# Patient Record
Sex: FEMALE | Race: OTHER | NOT HISPANIC OR LATINO | URBAN - METROPOLITAN AREA
[De-identification: names, ages, dates, MRNs, and addresses within clinical notes are randomized per-mention and may not be internally consistent; named-entity substitution may affect disease eponyms.]

---

## 2017-01-07 ENCOUNTER — EMERGENCY (EMERGENCY)
Facility: HOSPITAL | Age: 32
LOS: 1 days | Discharge: HOME | End: 2017-01-07
Attending: EMERGENCY MEDICINE | Admitting: EMERGENCY MEDICINE
Payer: SELF-PAY

## 2017-01-07 VITALS
RESPIRATION RATE: 18 BRPM | HEART RATE: 102 BPM | DIASTOLIC BLOOD PRESSURE: 60 MMHG | OXYGEN SATURATION: 99 % | TEMPERATURE: 98 F | SYSTOLIC BLOOD PRESSURE: 99 MMHG

## 2017-01-07 VITALS
TEMPERATURE: 99 F | RESPIRATION RATE: 16 BRPM | OXYGEN SATURATION: 100 % | DIASTOLIC BLOOD PRESSURE: 57 MMHG | SYSTOLIC BLOOD PRESSURE: 130 MMHG | HEART RATE: 95 BPM

## 2017-01-07 LAB
BASE EXCESS BLDV CALC-SCNC: -0.8 MMOL/L — SIGNIFICANT CHANGE UP
BLOOD GAS VENOUS - CREATININE: 0.59 MG/DL — SIGNIFICANT CHANGE UP (ref 0.5–1.3)
CHLORIDE BLDV-SCNC: 106 MMOL/L — SIGNIFICANT CHANGE UP (ref 96–108)
GAS PNL BLDV: 133 MMOL/L — LOW (ref 136–146)
GLUCOSE BLDV-MCNC: 99 — SIGNIFICANT CHANGE UP (ref 70–99)
HCO3 BLDV-SCNC: 23 MMOL/L — SIGNIFICANT CHANGE UP (ref 20–27)
HCT VFR BLDV CALC: 41.7 % — SIGNIFICANT CHANGE UP (ref 34.5–45)
HGB BLDV-MCNC: 13.6 G/DL — SIGNIFICANT CHANGE UP (ref 11.5–15.5)
LACTATE BLDV-MCNC: 2.1 MMOL/L — HIGH (ref 0.5–2)
PCO2 BLDV: 42 MMHG — SIGNIFICANT CHANGE UP (ref 41–51)
PH BLDV: 7.37 PH — SIGNIFICANT CHANGE UP (ref 7.32–7.43)
PO2 BLDV: 43 MMHG — HIGH (ref 35–40)
POTASSIUM BLDV-SCNC: 3.7 MMOL/L — SIGNIFICANT CHANGE UP (ref 3.4–4.5)
SAO2 % BLDV: 75.3 % — SIGNIFICANT CHANGE UP (ref 60–85)

## 2017-01-07 PROCEDURE — 93010 ELECTROCARDIOGRAM REPORT: CPT

## 2017-01-07 PROCEDURE — 99284 EMERGENCY DEPT VISIT MOD MDM: CPT | Mod: 25

## 2017-01-07 RX ORDER — ONDANSETRON 8 MG/1
4 TABLET, FILM COATED ORAL ONCE
Qty: 0 | Refills: 0 | Status: COMPLETED | OUTPATIENT
Start: 2017-01-07 | End: 2017-01-07

## 2017-01-07 RX ORDER — SODIUM CHLORIDE 9 MG/ML
1000 INJECTION INTRAMUSCULAR; INTRAVENOUS; SUBCUTANEOUS ONCE
Qty: 0 | Refills: 0 | Status: COMPLETED | OUTPATIENT
Start: 2017-01-07 | End: 2017-01-07

## 2017-01-07 RX ORDER — ACETAMINOPHEN 500 MG
1000 TABLET ORAL ONCE
Qty: 0 | Refills: 0 | Status: COMPLETED | OUTPATIENT
Start: 2017-01-07 | End: 2017-01-07

## 2017-01-07 RX ADMIN — ONDANSETRON 4 MILLIGRAM(S): 8 TABLET, FILM COATED ORAL at 17:56

## 2017-01-07 RX ADMIN — Medication 400 MILLIGRAM(S): at 18:08

## 2017-01-07 RX ADMIN — SODIUM CHLORIDE 1000 MILLILITER(S): 9 INJECTION INTRAMUSCULAR; INTRAVENOUS; SUBCUTANEOUS at 18:08

## 2017-01-07 NOTE — ED PROVIDER NOTE - OBJECTIVE STATEMENT
32yo F no sig PMHx p/w CC flu like symptoms - she states that today she began experiencing fever, chills, rhinorrhea, generalized body aches, nausea and multiple episodes of NBNB vomiting. She also reports brief episode of sharp chest pain which occurred earlier today and lasted seconds and resolved spontaneously. Pt. visited urgent care today and tested positive for Influenza A. She currently denies visual changes, sore throat, chest pain or discomfort, SOB, palpitations, abdominal pain, diarrhea, urinary symptoms.

## 2017-01-07 NOTE — ED ADULT NURSE NOTE - CHIEF COMPLAINT QUOTE
pt sent from [facility] for CP with + influenza A with symptoms started 1 day ago. R/O pericarditis/myocarditis see EKG/CXR in chart.   c/o body aches/nasal congestion

## 2017-01-07 NOTE — ED ADULT TRIAGE NOTE - CHIEF COMPLAINT QUOTE
pt sent from [facility] for CP with + influenza A with symptoms started 1 day ago. R/O pericarditis/myocarditis see EKG/CXR in chart. pt sent from [facility] for CP with + influenza A with symptoms started 1 day ago. R/O pericarditis/myocarditis see EKG/CXR in chart.   c/o body aches/nasal congestion

## 2017-01-07 NOTE — ED ADULT NURSE NOTE - OBJECTIVE STATEMENT
pt received A&Ox3, c/o bodyache and chills, pt denies sob, denies chest pain, skin intact, pt came from [facility] and they told her she has the flu. will monitor , labs sent.

## 2017-01-08 NOTE — ED POST DISCHARGE NOTE - REASON FOR FOLLOW-UP
Other Pharmacy called Tamiflu ERX for 1 pill . I discussed with pharmacist will switch to Tamiflu 75mg BID X 5 days.

## 2020-01-02 ENCOUNTER — RESULT REVIEW (OUTPATIENT)
Age: 35
End: 2020-01-02

## 2020-02-04 ENCOUNTER — APPOINTMENT (OUTPATIENT)
Dept: DERMATOLOGY | Facility: CLINIC | Age: 35
End: 2020-02-04
Payer: COMMERCIAL

## 2020-02-04 VITALS
WEIGHT: 142 LBS | HEIGHT: 63 IN | BODY MASS INDEX: 25.16 KG/M2 | DIASTOLIC BLOOD PRESSURE: 74 MMHG | SYSTOLIC BLOOD PRESSURE: 110 MMHG

## 2020-02-04 DIAGNOSIS — Z78.9 OTHER SPECIFIED HEALTH STATUS: ICD-10-CM

## 2020-02-04 DIAGNOSIS — Z87.2 PERSONAL HISTORY OF DISEASES OF THE SKIN AND SUBCUTANEOUS TISSUE: ICD-10-CM

## 2020-02-04 DIAGNOSIS — Z91.89 OTHER SPECIFIED PERSONAL RISK FACTORS, NOT ELSEWHERE CLASSIFIED: ICD-10-CM

## 2020-02-04 PROBLEM — Z00.00 ENCOUNTER FOR PREVENTIVE HEALTH EXAMINATION: Status: ACTIVE | Noted: 2020-02-04

## 2020-02-04 PROCEDURE — 99203 OFFICE O/P NEW LOW 30 MIN: CPT | Mod: 25

## 2020-02-04 PROCEDURE — 11900 INJECT SKIN LESIONS </W 7: CPT

## 2020-02-04 RX ORDER — ERYTHROMYCIN 20 MG/G
GEL TOPICAL
Refills: 0 | Status: ACTIVE | COMMUNITY

## 2020-04-26 ENCOUNTER — MESSAGE (OUTPATIENT)
Age: 35
End: 2020-04-26

## 2020-05-11 LAB
SARS-COV-2 IGG SERPL IA-ACNC: 0 INDEX
SARS-COV-2 IGG SERPL QL IA: NEGATIVE

## 2020-06-02 ENCOUNTER — APPOINTMENT (OUTPATIENT)
Dept: DERMATOLOGY | Facility: CLINIC | Age: 35
End: 2020-06-02
Payer: COMMERCIAL

## 2020-06-02 VITALS — TEMPERATURE: 97.8 F

## 2020-06-02 PROCEDURE — 99214 OFFICE O/P EST MOD 30 MIN: CPT

## 2020-06-02 RX ORDER — CLINDAMYCIN PHOSPHATE 1 G/10ML
1 GEL TOPICAL
Qty: 1 | Refills: 2 | Status: ACTIVE | COMMUNITY
Start: 2020-06-02 | End: 1900-01-01

## 2020-09-04 ENCOUNTER — APPOINTMENT (OUTPATIENT)
Dept: DERMATOLOGY | Facility: CLINIC | Age: 35
End: 2020-09-04
Payer: COMMERCIAL

## 2020-09-04 VITALS — TEMPERATURE: 98.4 F

## 2020-09-04 DIAGNOSIS — L81.1 CHLOASMA: ICD-10-CM

## 2020-09-04 PROCEDURE — 99213 OFFICE O/P EST LOW 20 MIN: CPT

## 2020-09-04 RX ORDER — SPIRONOLACTONE 100 MG/1
100 TABLET ORAL
Qty: 30 | Refills: 2 | Status: ACTIVE | COMMUNITY
Start: 2020-09-04 | End: 1900-01-01

## 2020-09-04 RX ORDER — TRETINOIN 0.5 MG/G
0.05 CREAM TOPICAL
Qty: 1 | Refills: 3 | Status: ACTIVE | COMMUNITY
Start: 2020-02-04 | End: 1900-01-01

## 2020-09-08 ENCOUNTER — APPOINTMENT (OUTPATIENT)
Dept: ENDOCRINOLOGY | Facility: CLINIC | Age: 35
End: 2020-09-08
Payer: COMMERCIAL

## 2020-09-08 VITALS
TEMPERATURE: 98.6 F | OXYGEN SATURATION: 99 % | DIASTOLIC BLOOD PRESSURE: 80 MMHG | WEIGHT: 159 LBS | HEART RATE: 85 BPM | BODY MASS INDEX: 28.17 KG/M2 | SYSTOLIC BLOOD PRESSURE: 110 MMHG | HEIGHT: 63 IN

## 2020-09-08 DIAGNOSIS — R63.5 ABNORMAL WEIGHT GAIN: ICD-10-CM

## 2020-09-08 PROCEDURE — 99204 OFFICE O/P NEW MOD 45 MIN: CPT | Mod: 25

## 2020-09-08 PROCEDURE — 36415 COLL VENOUS BLD VENIPUNCTURE: CPT

## 2020-09-08 NOTE — PHYSICAL EXAM
[Well Nourished] : well nourished [Alert] : alert [No Acute Distress] : no acute distress [Well Developed] : well developed [Normal Sclera/Conjunctiva] : normal sclera/conjunctiva [No Proptosis] : no proptosis [Normal Oropharynx] : the oropharynx was normal [EOMI] : extra ocular movement intact [Thyroid Not Enlarged] : the thyroid was not enlarged [No Thyroid Nodules] : no palpable thyroid nodules [No Respiratory Distress] : no respiratory distress [Clear to Auscultation] : lungs were clear to auscultation bilaterally [No Accessory Muscle Use] : no accessory muscle use [Normal S1, S2] : normal S1 and S2 [Normal Rate] : heart rate was normal [Regular Rhythm] : with a regular rhythm [No Edema] : no peripheral edema [Pedal Pulses Normal] : the pedal pulses are present [Normal Bowel Sounds] : normal bowel sounds [Not Tender] : non-tender [Not Distended] : not distended [Soft] : abdomen soft [Normal Anterior Cervical Nodes] : no anterior cervical lymphadenopathy [Normal Posterior Cervical Nodes] : no posterior cervical lymphadenopathy [Spine Straight] : spine straight [No Spinal Tenderness] : no spinal tenderness [No Stigmata of Cushings Syndrome] : no stigmata of Cushings Syndrome [Normal Gait] : normal gait [Normal Strength/Tone] : muscle strength and tone were normal [No Rash] : no rash [No Tremors] : no tremors [Normal Reflexes] : deep tendon reflexes were 2+ and symmetric [Oriented x3] : oriented to person, place, and time

## 2020-09-08 NOTE — PHYSICAL EXAM
[Well Nourished] : well nourished [Alert] : alert [Normal Sclera/Conjunctiva] : normal sclera/conjunctiva [No Acute Distress] : no acute distress [Well Developed] : well developed [No Proptosis] : no proptosis [Normal Oropharynx] : the oropharynx was normal [EOMI] : extra ocular movement intact [No Thyroid Nodules] : no palpable thyroid nodules [Thyroid Not Enlarged] : the thyroid was not enlarged [No Respiratory Distress] : no respiratory distress [Clear to Auscultation] : lungs were clear to auscultation bilaterally [No Accessory Muscle Use] : no accessory muscle use [Normal S1, S2] : normal S1 and S2 [Normal Rate] : heart rate was normal [Regular Rhythm] : with a regular rhythm [No Edema] : no peripheral edema [Pedal Pulses Normal] : the pedal pulses are present [Normal Bowel Sounds] : normal bowel sounds [Not Tender] : non-tender [Not Distended] : not distended [Soft] : abdomen soft [Normal Anterior Cervical Nodes] : no anterior cervical lymphadenopathy [Normal Posterior Cervical Nodes] : no posterior cervical lymphadenopathy [No Spinal Tenderness] : no spinal tenderness [Spine Straight] : spine straight [No Stigmata of Cushings Syndrome] : no stigmata of Cushings Syndrome [Normal Gait] : normal gait [Normal Strength/Tone] : muscle strength and tone were normal [No Rash] : no rash [Normal Reflexes] : deep tendon reflexes were 2+ and symmetric [No Tremors] : no tremors [Oriented x3] : oriented to person, place, and time

## 2020-09-15 PROBLEM — R63.5 WEIGHT GAIN: Status: ACTIVE | Noted: 2020-09-15

## 2020-10-09 ENCOUNTER — TRANSCRIPTION ENCOUNTER (OUTPATIENT)
Age: 35
End: 2020-10-09

## 2020-10-09 ENCOUNTER — APPOINTMENT (OUTPATIENT)
Dept: ENDOCRINOLOGY | Facility: CLINIC | Age: 35
End: 2020-10-09
Payer: COMMERCIAL

## 2020-10-09 VITALS
OXYGEN SATURATION: 99 % | HEART RATE: 82 BPM | BODY MASS INDEX: 28.35 KG/M2 | TEMPERATURE: 98.5 F | WEIGHT: 160 LBS | HEIGHT: 63 IN | DIASTOLIC BLOOD PRESSURE: 75 MMHG | SYSTOLIC BLOOD PRESSURE: 110 MMHG

## 2020-10-09 LAB
25(OH)D3 SERPL-MCNC: 23.5 NG/ML
ACTH SER-ACNC: 6.6 PG/ML
ALBUMIN SERPL ELPH-MCNC: 4.4 G/DL
ALP BLD-CCNC: 73 U/L
ALT SERPL-CCNC: 9 U/L
ANION GAP SERPL CALC-SCNC: 12 MMOL/L
AST SERPL-CCNC: 16 U/L
BILIRUB SERPL-MCNC: 0.2 MG/DL
BUN SERPL-MCNC: 17 MG/DL
CALCIUM SERPL-MCNC: 9.8 MG/DL
CHLORIDE SERPL-SCNC: 103 MMOL/L
CHOLEST SERPL-MCNC: 328 MG/DL
CHOLEST/HDLC SERPL: 5.8 RATIO
CO2 SERPL-SCNC: 20 MMOL/L
CORTICOSTEROID BIND GLOBULIN: 4.2 MG/DL
CORTICOSTEROID BIND GLOBULIN: 4.5 MG/DL
CORTIS SERPL-MCNC: 22 UG/DL
CORTIS SERPL-MCNC: 26 UG/DL
CORTIS SERPL-MCNC: 4.3 UG/DL
CORTISOL, FREE: 1.4 UG/DL
CREAT SERPL-MCNC: 0.65 MG/DL
DEXAMETHASONE LEVEL: NORMAL
FERRITIN SERPL-MCNC: 69 NG/ML
GH SERPL-MCNC: 0.15 NG/ML
GLUCOSE SERPL-MCNC: 90 MG/DL
HDLC SERPL-MCNC: 56 MG/DL
IGF-1 INTERP: NORMAL
IGF-I BLD-MCNC: 198 NG/ML
INSULIN SERPL-MCNC: 12.1 UU/ML
IRON SERPL-MCNC: 73 UG/DL
LDLC SERPL CALC-MCNC: 236 MG/DL
PFCX: 5.4 %
POTASSIUM SERPL-SCNC: 4.2 MMOL/L
PROT SERPL-MCNC: 7.8 G/DL
SODIUM SERPL-SCNC: 136 MMOL/L
T3FREE SERPL-MCNC: 3.2 PG/ML
T4 FREE SERPL-MCNC: 1.2 NG/DL
THYROGLOB AB SERPL-ACNC: <20 IU/ML
THYROPEROXIDASE AB SERPL IA-ACNC: <10 IU/ML
TRIGL SERPL-MCNC: 180 MG/DL
TSH SERPL-ACNC: 1.73 UIU/ML
UBIQUINONE10 SERPL-MCNC: 1.64 UG/ML

## 2020-10-09 PROCEDURE — 99214 OFFICE O/P EST MOD 30 MIN: CPT | Mod: 25

## 2020-10-09 PROCEDURE — 36415 COLL VENOUS BLD VENIPUNCTURE: CPT

## 2020-10-09 NOTE — HISTORY OF PRESENT ILLNESS
[FreeTextEntry1] : Ms. SIGALA  is a 35 year  year old female  who returns with regard to a history of difficulties with hair thinning and acne.  Has had hair thinning which had worsened of late  Too has had acne not well controlled on Spironolactone and tretinoin along with past use of clindamycin.  She has been on Junel-ocp in addition. Had bald patch left posterior region of scalp-had this prior- each time improved with a corticosteroid injection.\par Denies hirsutism.\par Menses have been regular\par Spironolactone now up to 100 mg daily\par On Junel(restart) since 2017.\par Besides the alopecia -too has diffuse hair thinning.\par Acne mild and stress related-facial-jaw and chin area.\par Off ocp-menses reg and on ocp reg but occas spotting. Occasionally would last only one day.\par Denies overt cushingoid s/s.  Does tell of wt gain in abd area-does wt lift-struggling ty loose weight of late. has stress acne, heartburn, melasma and headaches.\par Headaches frontal region\par Had  in past 5 d per wek-not so of alte-no time\par \par Recebnt  incr stress-acne, hair loss and cold sores preent  Acne stable\par Recent labs with cortisol am of 4,2 post 1mg dexamethasone the night prior. However, she does have elevated cbg level at 4.2 with upper limit of 3.1-os on ocp which can raise the cbg level.TFT's were wnl.  LDL chol was signif elevated at 236 with TC at 328 and trig of 180 and HDL of 56  No hx of premature cad in family. ACTH was 6.6 at 9:30 in am and Vit d 25-OH was 23.5

## 2020-10-20 ENCOUNTER — NON-APPOINTMENT (OUTPATIENT)
Age: 35
End: 2020-10-20

## 2020-10-30 ENCOUNTER — APPOINTMENT (OUTPATIENT)
Dept: ENDOCRINOLOGY | Facility: CLINIC | Age: 35
End: 2020-10-30
Payer: COMMERCIAL

## 2020-10-30 VITALS
WEIGHT: 159 LBS | HEIGHT: 63 IN | DIASTOLIC BLOOD PRESSURE: 82 MMHG | TEMPERATURE: 98.6 F | SYSTOLIC BLOOD PRESSURE: 120 MMHG | HEART RATE: 74 BPM | BODY MASS INDEX: 28.17 KG/M2 | OXYGEN SATURATION: 99 % | RESPIRATION RATE: 16 BRPM

## 2020-10-30 PROCEDURE — 99214 OFFICE O/P EST MOD 30 MIN: CPT

## 2020-10-30 RX ORDER — ESCITALOPRAM OXALATE 5 MG/1
5 TABLET ORAL
Qty: 60 | Refills: 1 | Status: ACTIVE | COMMUNITY
Start: 2020-10-30 | End: 1900-01-01

## 2020-11-04 ENCOUNTER — NON-APPOINTMENT (OUTPATIENT)
Age: 35
End: 2020-11-04

## 2020-11-06 LAB
ALBUMIN SERPL ELPH-MCNC: 4.5 G/DL
ALP BLD-CCNC: 74 U/L
ALT SERPL-CCNC: 11 U/L
ANION GAP SERPL CALC-SCNC: 14 MMOL/L
AST SERPL-CCNC: 15 U/L
BILIRUB SERPL-MCNC: 0.2 MG/DL
BUN SERPL-MCNC: 17 MG/DL
CALCIUM SERPL-MCNC: 9.6 MG/DL
CHLORIDE SERPL-SCNC: 102 MMOL/L
CK SERPL-CCNC: 37 U/L
CO2 SERPL-SCNC: 20 MMOL/L
CORTIS 24H UR-MCNC: 24 H
CORTIS 24H UR-MRATE: 27 MCG/24 H
CREAT SERPL-MCNC: 0.65 MG/DL
FERRITIN SERPL-MCNC: 57 NG/ML
GLUCOSE SERPL-MCNC: 80 MG/DL
IRON SERPL-MCNC: 99 UG/DL
MAGNESIUM SERPL-MCNC: 2 MG/DL
POTASSIUM SERPL-SCNC: 4.7 MMOL/L
PROT SERPL-MCNC: 7.7 G/DL
SODIUM SERPL-SCNC: 137 MMOL/L
SPECIMEN VOL 24H UR: 650 ML
VIT B12 SERPL-MCNC: 204 PG/ML

## 2020-11-07 VITALS
HEIGHT: 63 IN | BODY MASS INDEX: 28.17 KG/M2 | OXYGEN SATURATION: 99 % | HEART RATE: 72 BPM | SYSTOLIC BLOOD PRESSURE: 118 MMHG | WEIGHT: 159 LBS | DIASTOLIC BLOOD PRESSURE: 76 MMHG | TEMPERATURE: 98.6 F

## 2020-11-07 NOTE — HISTORY OF PRESENT ILLNESS
[FreeTextEntry1] : Ms. SIGALA  is a 35 year  year old female  who returns with regard to a history of difficulties with hair thinning and acne.  Has had hair thinning which had worsened of late  Too has had acne not well controlled on Spironolactone and tretinoin along with past use of clindamycin.  She has been on Junel-ocp in addition. Had bald patch left posterior region of scalp-had this prior- each time improved with a corticosteroid injection.\par Denies hirsutism.\par Menses have been regular\par Spironolactone now up to 100 mg daily\par On Junel(restart) since 2017.\par Besides the alopecia -too has diffuse hair thinning.\par Acne mild and stress related-facial-jaw and chin area.\par Off ocp-menses reg and on ocp reg but occas spotting. Occasionally would last only one day.\par Denies overt cushingoid s/s.  Does tell of wt gain in abd area-does wt lift-struggling ty loose weight of late. has stress acne, heartburn, melasma and headaches.\par Headaches frontal region\par Had  in past 5 d per week-not so of late-no time\par \par Recebnt  incr stress-acne, hair loss and cold sores present  Acne stable\par Recent labs with cortisol am of 4,2 post 1mg dexamethasone the night prior. However, she does have elevated cbg level at 4.2 with upper limit of 3.1-os on ocp which can raise the cbg level.TFT's were wnl.  LDL chol was signif elevated at 236 with TC at 328 and trig of 180 and HDL of 56  No hx of premature cad in family. ACTH was 6.6 at 9:30 in am and Vit d 25-OH was 23.5

## 2020-11-18 NOTE — HISTORY OF PRESENT ILLNESS
[FreeTextEntry1] : Ms. SIGALA  is a 35 year  year old female  who returns with regard to a history of difficulties with hair thinning and acne.  Has had hair thinning which had worsened of late  Too has had acne not well controlled on Spironolactone and tretinoin along with past use of clindamycin.  She has been on Junel-ocp in addition. Had bald patch left posterior region of scalp-had this prior- each time improved with a corticosteroid injection.\par Denies hirsutism.\par Menses have been regular\par Dexam suppr not fully supp but cbg is elevated. Recent 24 hr urine cortisol normal\par Prior testosterone and Dheas wnl.\par Spironolactone now up to 100 mg daily.\par On Junel(restart) since 2017.\par Besides the alopecia -too has diffuse hair thinning.\par Acne mild and stress related-facial-jaw and chin area.\par Off ocp-menses reg and on ocp reg but occas spotting. Occasionally would last only one day.\par Denies overt cushingoid s/s.  Does tell of wt gain in abd area-does wt lift-struggling ty loose weight of late. has stress acne, heartburn, melasma and headaches.\par Headaches frontal region\par Had  in past 5 d per week-not so of late -no time\par Menses were eg of ocp. were lasting8 days\par notes fatigue, wt gain low energy acne heartburn hair thinning\par Anxious frustrated\par hair coming out somewhat more.\par Acne stable\par

## 2020-12-03 ENCOUNTER — NON-APPOINTMENT (OUTPATIENT)
Age: 35
End: 2020-12-03

## 2020-12-14 ENCOUNTER — NON-APPOINTMENT (OUTPATIENT)
Age: 35
End: 2020-12-14

## 2020-12-14 ENCOUNTER — APPOINTMENT (OUTPATIENT)
Dept: ENDOCRINOLOGY | Facility: CLINIC | Age: 35
End: 2020-12-14
Payer: COMMERCIAL

## 2020-12-14 ENCOUNTER — APPOINTMENT (OUTPATIENT)
Dept: CARDIOLOGY | Facility: CLINIC | Age: 35
End: 2020-12-14
Payer: COMMERCIAL

## 2020-12-14 VITALS
TEMPERATURE: 98.1 F | DIASTOLIC BLOOD PRESSURE: 70 MMHG | OXYGEN SATURATION: 98 % | BODY MASS INDEX: 28.88 KG/M2 | WEIGHT: 163 LBS | SYSTOLIC BLOOD PRESSURE: 110 MMHG | HEIGHT: 63 IN | HEART RATE: 71 BPM

## 2020-12-14 DIAGNOSIS — Z00.00 ENCOUNTER FOR GENERAL ADULT MEDICAL EXAMINATION W/OUT ABNORMAL FINDINGS: ICD-10-CM

## 2020-12-14 DIAGNOSIS — L63.9 ALOPECIA AREATA, UNSPECIFIED: ICD-10-CM

## 2020-12-14 DIAGNOSIS — E04.9 NONTOXIC GOITER, UNSPECIFIED: ICD-10-CM

## 2020-12-14 DIAGNOSIS — R51.9 HEADACHE, UNSPECIFIED: ICD-10-CM

## 2020-12-14 DIAGNOSIS — R19.7 DIARRHEA, UNSPECIFIED: ICD-10-CM

## 2020-12-14 DIAGNOSIS — L64.9 ANDROGENIC ALOPECIA, UNSPECIFIED: ICD-10-CM

## 2020-12-14 DIAGNOSIS — Z71.89 OTHER SPECIFIED COUNSELING: ICD-10-CM

## 2020-12-14 PROCEDURE — 93000 ELECTROCARDIOGRAM COMPLETE: CPT

## 2020-12-14 PROCEDURE — 99385 PREV VISIT NEW AGE 18-39: CPT

## 2020-12-14 PROCEDURE — 99072 ADDL SUPL MATRL&STAF TM PHE: CPT

## 2020-12-14 PROCEDURE — 76536 US EXAM OF HEAD AND NECK: CPT

## 2020-12-14 NOTE — HISTORY OF PRESENT ILLNESS
[FreeTextEntry1] : Establish care  [de-identified] : This is a 35 year old lady with a PMH of Vitamin D Deficiency and alopecia presents today to establish care at our office. Patient presents with multiple somatic complaints. Patient states that she has a lot of stress and anxiety due to her job. Patient states that she was prescribed Lexapro, however, she has not started that medication. Patient states that she experiences headaches, occasional light headedness, diarrhea, vomiting, fatigue intermittently that she believes is related to her stress level. Patient denies dyspnea, palpitations, chest pain, and dizziness.\par

## 2020-12-14 NOTE — IMPRESSION
[FreeTextEntry1] : Mildly heterogenous thyroid without goiter and without nodularity.  Follow  US as needed.

## 2020-12-14 NOTE — PROCEDURE
[[employer] e 2008 model, 10-12 MHz frequencies] : multiple real time longitudinal and transverse images were obtained using a high resolution ultrasound with a linear transducer, [employer] e 2008 model, 10-12 MHz frequencies. All measurements will be reported as longitudinal x yue-posterior x transverse. [Goiter] : goiter [] : a heterogeneous parenchyma [There are no distinct nodules visualized.] : There are no distinct nodules visualized. [FreeTextEntry1] : 3.09 x 1.34 x 1.92 [FreeTextEntry5] : 2.91 x 1.31 x 1.83 [FreeTextEntry2] : 0.34

## 2020-12-14 NOTE — REVIEW OF SYSTEMS
[Fatigue] : fatigue [Hair Changes] : hair changes [Skin Rash] : skin rash [Anxiety] : anxiety [Negative] : Heme/Lymph [Fever] : no fever [Chills] : no chills [Hot Flashes] : no hot flashes [Night Sweats] : no night sweats [Recent Change In Weight] : ~T no recent weight change [Itching] : no itching [Mole Changes] : no mole changes [Nail Changes] : no nail changes [Suicidal] : not suicidal [Insomnia] : no insomnia [Depression] : no depression

## 2020-12-14 NOTE — PHYSICAL EXAM
[No Acute Distress] : no acute distress [Well Nourished] : well nourished [Well Developed] : well developed [Well-Appearing] : well-appearing [Normal Sclera/Conjunctiva] : normal sclera/conjunctiva [PERRL] : pupils equal round and reactive to light [EOMI] : extraocular movements intact [Normal Outer Ear/Nose] : the outer ears and nose were normal in appearance [Normal Oropharynx] : the oropharynx was normal [No JVD] : no jugular venous distention [No Lymphadenopathy] : no lymphadenopathy [Supple] : supple [Thyroid Normal, No Nodules] : the thyroid was normal and there were no nodules present [No Respiratory Distress] : no respiratory distress  [No Accessory Muscle Use] : no accessory muscle use [Clear to Auscultation] : lungs were clear to auscultation bilaterally [Normal Rate] : normal rate  [Regular Rhythm] : with a regular rhythm [Normal S1, S2] : normal S1 and S2 [No Murmur] : no murmur heard [No Carotid Bruits] : no carotid bruits [No Abdominal Bruit] : a ~M bruit was not heard ~T in the abdomen [No Varicosities] : no varicosities [Pedal Pulses Present] : the pedal pulses are present [No Edema] : there was no peripheral edema [No Palpable Aorta] : no palpable aorta [No Extremity Clubbing/Cyanosis] : no extremity clubbing/cyanosis [Soft] : abdomen soft [Non Tender] : non-tender [Non-distended] : non-distended [No Masses] : no abdominal mass palpated [No HSM] : no HSM [Normal Bowel Sounds] : normal bowel sounds [Normal Posterior Cervical Nodes] : no posterior cervical lymphadenopathy [Normal Anterior Cervical Nodes] : no anterior cervical lymphadenopathy [No CVA Tenderness] : no CVA  tenderness [No Spinal Tenderness] : no spinal tenderness [No Joint Swelling] : no joint swelling [Grossly Normal Strength/Tone] : grossly normal strength/tone [No Rash] : no rash [Coordination Grossly Intact] : coordination grossly intact [No Focal Deficits] : no focal deficits [Normal Gait] : normal gait [Normal Affect] : the affect was normal [Normal Insight/Judgement] : insight and judgment were intact [de-identified] : goiter noted  [de-identified] : benedicto

## 2020-12-21 ENCOUNTER — APPOINTMENT (OUTPATIENT)
Dept: DERMATOLOGY | Facility: CLINIC | Age: 35
End: 2020-12-21

## 2020-12-28 ENCOUNTER — NON-APPOINTMENT (OUTPATIENT)
Age: 35
End: 2020-12-28

## 2020-12-28 DIAGNOSIS — R05 COUGH: ICD-10-CM

## 2020-12-29 ENCOUNTER — NON-APPOINTMENT (OUTPATIENT)
Age: 35
End: 2020-12-29

## 2020-12-29 ENCOUNTER — OUTPATIENT (OUTPATIENT)
Dept: OUTPATIENT SERVICES | Facility: HOSPITAL | Age: 35
LOS: 1 days | End: 2020-12-29
Payer: COMMERCIAL

## 2020-12-29 ENCOUNTER — APPOINTMENT (OUTPATIENT)
Dept: RADIOLOGY | Facility: IMAGING CENTER | Age: 35
End: 2020-12-29
Payer: COMMERCIAL

## 2020-12-29 VITALS
HEART RATE: 71 BPM | SYSTOLIC BLOOD PRESSURE: 110 MMHG | BODY MASS INDEX: 28.88 KG/M2 | RESPIRATION RATE: 16 BRPM | TEMPERATURE: 98.1 F | DIASTOLIC BLOOD PRESSURE: 70 MMHG | WEIGHT: 163 LBS | OXYGEN SATURATION: 98 % | HEIGHT: 63 IN

## 2020-12-29 VITALS
TEMPERATURE: 98.1 F | HEIGHT: 63 IN | DIASTOLIC BLOOD PRESSURE: 70 MMHG | RESPIRATION RATE: 1 BRPM | WEIGHT: 163 LBS | SYSTOLIC BLOOD PRESSURE: 110 MMHG | BODY MASS INDEX: 28.88 KG/M2 | HEART RATE: 71 BPM | OXYGEN SATURATION: 98 %

## 2020-12-29 DIAGNOSIS — R05 COUGH: ICD-10-CM

## 2020-12-29 PROBLEM — L64.9 ANDROGENETIC ALOPECIA: Status: ACTIVE | Noted: 2020-02-04

## 2020-12-29 PROBLEM — L63.9 ALOPECIA AREATA: Status: ACTIVE | Noted: 2020-02-04

## 2020-12-29 PROCEDURE — 71046 X-RAY EXAM CHEST 2 VIEWS: CPT

## 2020-12-29 PROCEDURE — 71046 X-RAY EXAM CHEST 2 VIEWS: CPT | Mod: 26

## 2020-12-29 NOTE — HISTORY OF PRESENT ILLNESS
[FreeTextEntry1] : Ms. SIGALA  is a 35 year  year old female  who returns with regard to a history of difficulties with hair thinning and acne.  Has had hair thinning which had worsened of late  Too has had acne not well controlled on Spironolactone and tretinoin along with past use of clindamycin.  She has been on Junel-ocp in addition. Had bald patch left posterior region of scalp-had this prior- each time improved with a corticosteroid injection.\par Denies hirsutism.\par Menses have been regular\par 24 hr urine cortisol wnl\par B-12 started today\par Prior testosterone and Dheas wnl.\par Spironolactone now up to 100 mg daily.\par On Junel(restart) since 2017.\par Besides the alopecia -too has diffuse hair thinning.\par Acne mild and stress related-facial-jaw and chin area.\par Off ocp-menses reg and on ocp reg but occas spotting. Occasionally would last only one day.\par Denies overt cushingoid s/s.  Does tell of wt gain in abd area-does wt lift-struggling ty loose weight of late. has stress acne, heartburn, melasma and headaches.\par Headaches frontal region\par Had  in past 5 d per week-not so of late -no time\par Menses were eg of ocp. were lasting8 days\par notes fatigue, wt gain low energy acne heartburn hair thinning\par Anxious frustrated\par hair coming out somewhat more.\par Acne stable\par ? goiter noted today per Dr. Edouard.\par

## 2020-12-29 NOTE — PHYSICAL EXAM
[Alert] : alert [Well Nourished] : well nourished [No Acute Distress] : no acute distress [Well Developed] : well developed [Normal Sclera/Conjunctiva] : normal sclera/conjunctiva [EOMI] : extra ocular movement intact [No Proptosis] : no proptosis [Normal Oropharynx] : the oropharynx was normal [Thyroid Not Enlarged] : the thyroid was not enlarged [No Thyroid Nodules] : no palpable thyroid nodules [No Respiratory Distress] : no respiratory distress [No Accessory Muscle Use] : no accessory muscle use [Clear to Auscultation] : lungs were clear to auscultation bilaterally [Normal S1, S2] : normal S1 and S2 [Normal Rate] : heart rate was normal [Regular Rhythm] : with a regular rhythm [No Edema] : no peripheral edema [Pedal Pulses Normal] : the pedal pulses are present [Normal Bowel Sounds] : normal bowel sounds [Not Tender] : non-tender [Not Distended] : not distended [Soft] : abdomen soft [Normal Anterior Cervical Nodes] : no anterior cervical lymphadenopathy [Normal Posterior Cervical Nodes] : no posterior cervical lymphadenopathy [No Spinal Tenderness] : no spinal tenderness [Spine Straight] : spine straight [No Stigmata of Cushings Syndrome] : no stigmata of Cushings Syndrome [Normal Gait] : normal gait [Normal Strength/Tone] : muscle strength and tone were normal [No Rash] : no rash [Normal Reflexes] : deep tendon reflexes were 2+ and symmetric [No Tremors] : no tremors [Oriented x3] : oriented to person, place, and time

## 2020-12-31 ENCOUNTER — NON-APPOINTMENT (OUTPATIENT)
Age: 35
End: 2020-12-31

## 2020-12-31 RX ORDER — DOXYCYCLINE 100 MG/1
100 TABLET, FILM COATED ORAL
Qty: 20 | Refills: 0 | Status: ACTIVE | COMMUNITY
Start: 2020-12-31 | End: 1900-01-01

## 2021-01-03 ENCOUNTER — NON-APPOINTMENT (OUTPATIENT)
Age: 36
End: 2021-01-03

## 2021-01-08 ENCOUNTER — NON-APPOINTMENT (OUTPATIENT)
Age: 36
End: 2021-01-08

## 2021-01-11 ENCOUNTER — APPOINTMENT (OUTPATIENT)
Dept: RADIOLOGY | Facility: IMAGING CENTER | Age: 36
End: 2021-01-11
Payer: COMMERCIAL

## 2021-01-11 ENCOUNTER — OUTPATIENT (OUTPATIENT)
Dept: OUTPATIENT SERVICES | Facility: HOSPITAL | Age: 36
LOS: 1 days | End: 2021-01-11
Payer: COMMERCIAL

## 2021-01-11 DIAGNOSIS — U07.1 COVID-19: ICD-10-CM

## 2021-01-11 PROCEDURE — 71046 X-RAY EXAM CHEST 2 VIEWS: CPT

## 2021-01-11 PROCEDURE — 71046 X-RAY EXAM CHEST 2 VIEWS: CPT | Mod: 26

## 2021-01-20 ENCOUNTER — APPOINTMENT (OUTPATIENT)
Dept: CARDIOLOGY | Facility: CLINIC | Age: 36
End: 2021-01-20
Payer: COMMERCIAL

## 2021-01-20 VITALS
WEIGHT: 162 LBS | OXYGEN SATURATION: 99 % | DIASTOLIC BLOOD PRESSURE: 70 MMHG | BODY MASS INDEX: 28.7 KG/M2 | HEART RATE: 84 BPM | TEMPERATURE: 97.9 F | SYSTOLIC BLOOD PRESSURE: 102 MMHG | HEIGHT: 63 IN

## 2021-01-20 DIAGNOSIS — R82.90 UNSPECIFIED ABNORMAL FINDINGS IN URINE: ICD-10-CM

## 2021-01-20 PROCEDURE — 99072 ADDL SUPL MATRL&STAF TM PHE: CPT

## 2021-01-20 PROCEDURE — 99214 OFFICE O/P EST MOD 30 MIN: CPT

## 2021-01-20 NOTE — HISTORY OF PRESENT ILLNESS
[FreeTextEntry1] : This is a 35 year old lady that presents today for COVID-19 Diagnosis. Patient was swabbed positive on December 15, 2020. Patient states that at that time you had a cough, sore throat, body aches, anosmia,  and headaches. Patient states that all of those symptoms have resolved aside from residual fatigue. Patient states that she has been taking the B12 sublingual. Patient had repeat Chest XRAY on January 11, 2021, that was clear. Patient denies dyspnea, palpitations, chest pain, nausea, vomiting, dizziness and lightheadedness.\par

## 2021-01-20 NOTE — PHYSICAL EXAM
[General Appearance - Well Developed] : well developed [Normal Appearance] : normal appearance [Well Groomed] : well groomed [General Appearance - Well Nourished] : well nourished [No Deformities] : no deformities [Normal Conjunctiva] : the conjunctiva exhibited no abnormalities [General Appearance - In No Acute Distress] : no acute distress [Eyelids - No Xanthelasma] : the eyelids demonstrated no xanthelasmas [Normal Oral Mucosa] : normal oral mucosa [No Oral Pallor] : no oral pallor [No Oral Cyanosis] : no oral cyanosis [Normal Jugular Venous A Waves Present] : normal jugular venous A waves present [Normal Jugular Venous V Waves Present] : normal jugular venous V waves present [No Jugular Venous Barnard A Waves] : no jugular venous barnard A waves [Respiration, Rhythm And Depth] : normal respiratory rhythm and effort [Exaggerated Use Of Accessory Muscles For Inspiration] : no accessory muscle use [Auscultation Breath Sounds / Voice Sounds] : lungs were clear to auscultation bilaterally [Heart Rate And Rhythm] : heart rate and rhythm were normal [Heart Sounds] : normal S1 and S2 [Murmurs] : no murmurs present [Abdomen Soft] : soft [Abdomen Tenderness] : non-tender [Abdomen Mass (___ Cm)] : no abdominal mass palpated [Gait - Sufficient For Exercise Testing] : the gait was sufficient for exercise testing [Abnormal Walk] : normal gait [Cyanosis, Localized] : no localized cyanosis [Nail Clubbing] : no clubbing of the fingernails [Petechial Hemorrhages (___cm)] : no petechial hemorrhages [Skin Color & Pigmentation] : normal skin color and pigmentation [] : no rash [No Venous Stasis] : no venous stasis [Skin Lesions] : no skin lesions [No Skin Ulcers] : no skin ulcer [No Xanthoma] : no  xanthoma was observed [Oriented To Time, Place, And Person] : oriented to person, place, and time [Affect] : the affect was normal [Mood] : the mood was normal [No Anxiety] : not feeling anxious

## 2021-01-20 NOTE — REVIEW OF SYSTEMS
[Feeling Fatigued] : feeling fatigued [Anxiety] : anxiety [Under Stress] : under stress [Negative] : Heme/Lymph [Fever] : no fever [Headache] : no headache [Recent Weight Gain (___ Lbs)] : no recent weight gain [Chills] : no chills [Recent Weight Loss (___ Lbs)] : no recent weight loss [Confusion] : no confusion was observed [Memory Lapses Or Loss] : no memory lapses or loss [Depression] : no depression [Suicidal] : not suicidal

## 2021-01-25 ENCOUNTER — NON-APPOINTMENT (OUTPATIENT)
Age: 36
End: 2021-01-25

## 2021-01-26 ENCOUNTER — NON-APPOINTMENT (OUTPATIENT)
Age: 36
End: 2021-01-26

## 2021-01-26 LAB
25(OH)D3 SERPL-MCNC: 25.9 NG/ML
ALBUMIN SERPL ELPH-MCNC: 4 G/DL
ALBUMIN SERPL ELPH-MCNC: 4.3 G/DL
ALP BLD-CCNC: 67 U/L
ALP BLD-CCNC: 74 U/L
ALT SERPL-CCNC: 15 U/L
ALT SERPL-CCNC: 19 U/L
ANION GAP SERPL CALC-SCNC: 13 MMOL/L
APPEARANCE: CLEAR
APPEARANCE: CLEAR
AST SERPL-CCNC: 14 U/L
AST SERPL-CCNC: 16 U/L
BACTERIA UR CULT: NORMAL
BACTERIA: ABNORMAL
BACTERIA: NEGATIVE
BASOPHILS # BLD AUTO: 0.01 K/UL
BASOPHILS # BLD AUTO: 0.01 K/UL
BASOPHILS # BLD AUTO: 0.02 K/UL
BASOPHILS NFR BLD AUTO: 0.1 %
BASOPHILS NFR BLD AUTO: 0.2 %
BASOPHILS NFR BLD AUTO: 0.3 %
BILIRUB DIRECT SERPL-MCNC: 0 MG/DL
BILIRUB INDIRECT SERPL-MCNC: 0.1 MG/DL
BILIRUB SERPL-MCNC: 0.4 MG/DL
BILIRUB SERPL-MCNC: <0.2 MG/DL
BILIRUBIN URINE: NEGATIVE
BILIRUBIN URINE: NEGATIVE
BLOOD URINE: ABNORMAL
BLOOD URINE: NEGATIVE
BUN SERPL-MCNC: 11 MG/DL
CALCIUM SERPL-MCNC: 8.9 MG/DL
CHLORIDE SERPL-SCNC: 107 MMOL/L
CHOLEST SERPL-MCNC: 262 MG/DL
CO2 SERPL-SCNC: 20 MMOL/L
COLOR: YELLOW
COLOR: YELLOW
CREAT SERPL-MCNC: 0.83 MG/DL
CRP SERPL-MCNC: 0.73 MG/DL
CRP SERPL-MCNC: 0.78 MG/DL
DEPRECATED D DIMER PPP IA-ACNC: 196 NG/ML DDU
DEPRECATED D DIMER PPP IA-ACNC: 245 NG/ML DDU
EOSINOPHIL # BLD AUTO: 0.02 K/UL
EOSINOPHIL # BLD AUTO: 0.05 K/UL
EOSINOPHIL # BLD AUTO: 0.06 K/UL
EOSINOPHIL NFR BLD AUTO: 0.4 %
EOSINOPHIL NFR BLD AUTO: 0.5 %
EOSINOPHIL NFR BLD AUTO: 0.9 %
ERYTHROCYTE [SEDIMENTATION RATE] IN BLOOD BY WESTERGREN METHOD: 10 MM/HR
ESTIMATED AVERAGE GLUCOSE: 97 MG/DL
FERRITIN SERPL-MCNC: 69 NG/ML
FERRITIN SERPL-MCNC: 87 NG/ML
FOLATE SERPL-MCNC: 12.9 NG/ML
GLUCOSE QUALITATIVE U: NEGATIVE
GLUCOSE QUALITATIVE U: NEGATIVE
GLUCOSE SERPL-MCNC: 84 MG/DL
HBA1C MFR BLD HPLC: 5 %
HCG SERPL-MCNC: <1 MIU/ML
HCT VFR BLD CALC: 38.7 %
HCT VFR BLD CALC: 40 %
HCT VFR BLD CALC: 45.1 %
HDLC SERPL-MCNC: 59 MG/DL
HGB BLD-MCNC: 12.5 G/DL
HGB BLD-MCNC: 13.1 G/DL
HGB BLD-MCNC: 13.9 G/DL
HYALINE CASTS: 0 /LPF
HYALINE CASTS: 2 /LPF
IF BLOCK AB SER QL: 1 AU/ML
IMM GRANULOCYTES NFR BLD AUTO: 0.2 %
IMM GRANULOCYTES NFR BLD AUTO: 0.3 %
IMM GRANULOCYTES NFR BLD AUTO: 0.3 %
IRON SERPL-MCNC: 55 UG/DL
KETONES URINE: NEGATIVE
KETONES URINE: NORMAL
LDLC SERPL CALC-MCNC: 181 MG/DL
LEUKOCYTE ESTERASE URINE: NEGATIVE
LEUKOCYTE ESTERASE URINE: NEGATIVE
LYMPHOCYTES # BLD AUTO: 1.04 K/UL
LYMPHOCYTES # BLD AUTO: 1.63 K/UL
LYMPHOCYTES # BLD AUTO: 2.1 K/UL
LYMPHOCYTES NFR BLD AUTO: 18.6 %
LYMPHOCYTES NFR BLD AUTO: 25.1 %
LYMPHOCYTES NFR BLD AUTO: 25.8 %
MAGNESIUM SERPL-MCNC: 2 MG/DL
MAN DIFF?: NORMAL
MCHC RBC-ENTMCNC: 28.6 PG
MCHC RBC-ENTMCNC: 28.9 PG
MCHC RBC-ENTMCNC: 29.3 PG
MCHC RBC-ENTMCNC: 30.8 GM/DL
MCHC RBC-ENTMCNC: 32.3 GM/DL
MCHC RBC-ENTMCNC: 32.8 GM/DL
MCV RBC AUTO: 89.5 FL
MCV RBC AUTO: 89.6 FL
MCV RBC AUTO: 92.8 FL
METHYLMALONATE SERPL-SCNC: 141 NMOL/L
MICROSCOPIC-UA: NORMAL
MICROSCOPIC-UA: NORMAL
MONOCYTES # BLD AUTO: 0.35 K/UL
MONOCYTES # BLD AUTO: 0.42 K/UL
MONOCYTES # BLD AUTO: 0.57 K/UL
MONOCYTES NFR BLD AUTO: 10.1 %
MONOCYTES NFR BLD AUTO: 5 %
MONOCYTES NFR BLD AUTO: 5.5 %
NEUTROPHILS # BLD AUTO: 2.64 K/UL
NEUTROPHILS # BLD AUTO: 4.25 K/UL
NEUTROPHILS # BLD AUTO: 8.56 K/UL
NEUTROPHILS NFR BLD AUTO: 63.9 %
NEUTROPHILS NFR BLD AUTO: 67.2 %
NEUTROPHILS NFR BLD AUTO: 75.6 %
NITRITE URINE: NEGATIVE
NITRITE URINE: NEGATIVE
NONHDLC SERPL-MCNC: 203 MG/DL
PCA AB SER QL IF: NORMAL
PH URINE: 5.5
PH URINE: 5.5
PHOSPHATE SERPL-MCNC: 2.2 MG/DL
PLATELET # BLD AUTO: 263 K/UL
PLATELET # BLD AUTO: 264 K/UL
PLATELET # BLD AUTO: 310 K/UL
POTASSIUM SERPL-SCNC: 4.3 MMOL/L
PROCALCITONIN SERPL-MCNC: 0.04 NG/ML
PROCALCITONIN SERPL-MCNC: 0.05 NG/ML
PROT SERPL-MCNC: 6.6 G/DL
PROT SERPL-MCNC: 7.2 G/DL
PROTEIN URINE: NORMAL
PROTEIN URINE: NORMAL
RBC # BLD: 4.32 M/UL
RBC # BLD: 4.47 M/UL
RBC # BLD: 4.86 M/UL
RBC # FLD: 12.9 %
RBC # FLD: 13 %
RBC # FLD: 13.2 %
RED BLOOD CELLS URINE: 1 /HPF
RED BLOOD CELLS URINE: 3 /HPF
SARS-COV-2 IGG SERPL IA-ACNC: 20 INDEX
SARS-COV-2 IGG SERPL IA-ACNC: <0.1 INDEX
SARS-COV-2 IGG SERPL QL IA: NEGATIVE
SARS-COV-2 IGG SERPL QL IA: POSITIVE
SODIUM SERPL-SCNC: 140 MMOL/L
SPECIFIC GRAVITY URINE: 1.03
SPECIFIC GRAVITY URINE: 1.03
SQUAMOUS EPITHELIAL CELLS: 15 /HPF
SQUAMOUS EPITHELIAL CELLS: 3 /HPF
TRIGL SERPL-MCNC: 111 MG/DL
URATE SERPL-MCNC: 2.8 MG/DL
UROBILINOGEN URINE: NORMAL
UROBILINOGEN URINE: NORMAL
VIT B12 SERPL-MCNC: 229 PG/ML
VIT B12 SERPL-MCNC: 337 PG/ML
WBC # FLD AUTO: 11.32 K/UL
WBC # FLD AUTO: 4.14 K/UL
WBC # FLD AUTO: 6.33 K/UL
WHITE BLOOD CELLS URINE: 2 /HPF
WHITE BLOOD CELLS URINE: 5 /HPF

## 2021-01-27 LAB — METHYLMALONATE SERPL-SCNC: 273 NMOL/L

## 2021-02-03 ENCOUNTER — NON-APPOINTMENT (OUTPATIENT)
Age: 36
End: 2021-02-03

## 2021-03-03 ENCOUNTER — RESULT REVIEW (OUTPATIENT)
Age: 36
End: 2021-03-03

## 2021-03-17 ENCOUNTER — NON-APPOINTMENT (OUTPATIENT)
Age: 36
End: 2021-03-17

## 2021-03-26 ENCOUNTER — APPOINTMENT (OUTPATIENT)
Dept: DERMATOLOGY | Facility: CLINIC | Age: 36
End: 2021-03-26

## 2021-04-09 ENCOUNTER — APPOINTMENT (OUTPATIENT)
Dept: DERMATOLOGY | Facility: CLINIC | Age: 36
End: 2021-04-09

## 2021-04-21 ENCOUNTER — APPOINTMENT (OUTPATIENT)
Dept: DERMATOLOGY | Facility: CLINIC | Age: 36
End: 2021-04-21
Payer: COMMERCIAL

## 2021-04-21 VITALS — HEIGHT: 63 IN | BODY MASS INDEX: 28.35 KG/M2 | WEIGHT: 160 LBS

## 2021-04-21 PROCEDURE — 99072 ADDL SUPL MATRL&STAF TM PHE: CPT

## 2021-04-21 PROCEDURE — 99214 OFFICE O/P EST MOD 30 MIN: CPT

## 2021-04-21 RX ORDER — SPIRONOLACTONE 50 MG/1
50 TABLET ORAL
Qty: 90 | Refills: 5 | Status: ACTIVE | COMMUNITY
Start: 2021-04-21 | End: 1900-01-01

## 2021-04-26 LAB
ANION GAP SERPL CALC-SCNC: 14 MMOL/L
BUN SERPL-MCNC: 14 MG/DL
CALCIUM SERPL-MCNC: 9.2 MG/DL
CHLORIDE SERPL-SCNC: 102 MMOL/L
CO2 SERPL-SCNC: 19 MMOL/L
CREAT SERPL-MCNC: 0.58 MG/DL
GLUCOSE SERPL-MCNC: 84 MG/DL
POTASSIUM SERPL-SCNC: 3.7 MMOL/L
SODIUM SERPL-SCNC: 136 MMOL/L

## 2021-05-04 ENCOUNTER — APPOINTMENT (OUTPATIENT)
Dept: CARDIOLOGY | Facility: CLINIC | Age: 36
End: 2021-05-04
Payer: COMMERCIAL

## 2021-05-04 VITALS
SYSTOLIC BLOOD PRESSURE: 108 MMHG | OXYGEN SATURATION: 97 % | BODY MASS INDEX: 28.35 KG/M2 | TEMPERATURE: 98.4 F | WEIGHT: 160 LBS | HEART RATE: 83 BPM | DIASTOLIC BLOOD PRESSURE: 72 MMHG | HEIGHT: 63 IN

## 2021-05-04 DIAGNOSIS — Z12.39 ENCOUNTER FOR OTHER SCREENING FOR MALIGNANT NEOPLASM OF BREAST: ICD-10-CM

## 2021-05-04 DIAGNOSIS — R21 RASH AND OTHER NONSPECIFIC SKIN ERUPTION: ICD-10-CM

## 2021-05-04 DIAGNOSIS — U07.1 COVID-19: ICD-10-CM

## 2021-05-04 DIAGNOSIS — Z13.228 ENCOUNTER FOR SCREENING FOR OTHER METABOLIC DISORDERS: ICD-10-CM

## 2021-05-04 LAB
APTT BLD: 31.9 SEC
FIBRINOGEN PPP COAG.DERIVED-MCNC: 486 MG/DL
INR PPP: 1.1 RATIO
PT BLD: 12.9 SEC

## 2021-05-04 PROCEDURE — 99072 ADDL SUPL MATRL&STAF TM PHE: CPT

## 2021-05-04 PROCEDURE — 99214 OFFICE O/P EST MOD 30 MIN: CPT

## 2021-05-04 RX ORDER — SPIRONOLACTONE 50 MG/1
50 TABLET ORAL
Qty: 1 | Refills: 1 | Status: DISCONTINUED | COMMUNITY
Start: 2020-02-04 | End: 2021-05-04

## 2021-05-04 NOTE — HISTORY OF PRESENT ILLNESS
[FreeTextEntry1] : f/u medical issues  [de-identified] : This is a 36 year old lady who presents today for follow up. Patient states that she is overall feeling good, and has no major complaints at this time. Patient states that she has been seeing dermatology regarding a rash on her inner arms and inner thighs.  Patient states that her anxiety has been stable lately. Patient has been watching her diet regarding elevated cholesterol. Patient denies dyspnea, palpitations, chest pain, nausea, vomiting, dizziness and lightheadedness.\par

## 2021-05-04 NOTE — PHYSICAL EXAM
[No Acute Distress] : no acute distress [Well Nourished] : well nourished [Well Developed] : well developed [Well-Appearing] : well-appearing [Normal Sclera/Conjunctiva] : normal sclera/conjunctiva [PERRL] : pupils equal round and reactive to light [EOMI] : extraocular movements intact [Normal Outer Ear/Nose] : the outer ears and nose were normal in appearance [Normal Oropharynx] : the oropharynx was normal [No JVD] : no jugular venous distention [No Lymphadenopathy] : no lymphadenopathy [Supple] : supple [Thyroid Normal, No Nodules] : the thyroid was normal and there were no nodules present [No Respiratory Distress] : no respiratory distress  [No Accessory Muscle Use] : no accessory muscle use [Clear to Auscultation] : lungs were clear to auscultation bilaterally [Normal Rate] : normal rate  [Regular Rhythm] : with a regular rhythm [Normal S1, S2] : normal S1 and S2 [No Murmur] : no murmur heard [No Carotid Bruits] : no carotid bruits [No Varicosities] : no varicosities [No Abdominal Bruit] : a ~M bruit was not heard ~T in the abdomen [Pedal Pulses Present] : the pedal pulses are present [No Edema] : there was no peripheral edema [No Palpable Aorta] : no palpable aorta [No Extremity Clubbing/Cyanosis] : no extremity clubbing/cyanosis [Soft] : abdomen soft [Non Tender] : non-tender [Non-distended] : non-distended [No Masses] : no abdominal mass palpated [No HSM] : no HSM [Normal Bowel Sounds] : normal bowel sounds [Normal Posterior Cervical Nodes] : no posterior cervical lymphadenopathy [Normal Anterior Cervical Nodes] : no anterior cervical lymphadenopathy [No CVA Tenderness] : no CVA  tenderness [No Spinal Tenderness] : no spinal tenderness [No Joint Swelling] : no joint swelling [Grossly Normal Strength/Tone] : grossly normal strength/tone [No Rash] : no rash [Coordination Grossly Intact] : coordination grossly intact [No Focal Deficits] : no focal deficits [Normal Gait] : normal gait [Deep Tendon Reflexes (DTR)] : deep tendon reflexes were 2+ and symmetric [Normal Affect] : the affect was normal [Normal Insight/Judgement] : insight and judgment were intact [de-identified] : hyperpigmentation on legs

## 2021-05-13 ENCOUNTER — NON-APPOINTMENT (OUTPATIENT)
Age: 36
End: 2021-05-13

## 2021-08-15 LAB
25(OH)D3 SERPL-MCNC: 30.5 NG/ML
ACTH SER-ACNC: 7.3 PG/ML
ALBUMIN SERPL ELPH-MCNC: 4.5 G/DL
ALP BLD-CCNC: 70 U/L
ALT SERPL-CCNC: 22 U/L
ANA PAT FLD IF-IMP: ABNORMAL
ANA SER IF-ACNC: ABNORMAL
ANION GAP SERPL CALC-SCNC: 14 MMOL/L
APTT BLD: 31.1 SEC
AST SERPL-CCNC: 21 U/L
BASOPHILS # BLD AUTO: 0.02 K/UL
BASOPHILS NFR BLD AUTO: 0.3 %
BILIRUB DIRECT SERPL-MCNC: 0.1 MG/DL
BILIRUB INDIRECT SERPL-MCNC: 0.1 MG/DL
BILIRUB SERPL-MCNC: 0.2 MG/DL
BUN SERPL-MCNC: 17 MG/DL
CALCIUM SERPL-MCNC: 10.2 MG/DL
CCP AB SER IA-ACNC: <8 UNITS
CHLORIDE SERPL-SCNC: 105 MMOL/L
CHOLEST SERPL-MCNC: 328 MG/DL
CO2 SERPL-SCNC: 19 MMOL/L
CORTICOSTEROID BIND GLOBULIN: 4.2 MG/DL
CORTIS SERPL-MCNC: 27 UG/DL
CORTISOL, FREE: 2.2 UG/DL
CREAT SERPL-MCNC: 0.66 MG/DL
CRP SERPL-MCNC: 7 MG/L
DSDNA AB SER-ACNC: <12 IU/ML
EOSINOPHIL # BLD AUTO: 0.08 K/UL
EOSINOPHIL NFR BLD AUTO: 1.1 %
ERYTHROCYTE [SEDIMENTATION RATE] IN BLOOD BY WESTERGREN METHOD: 27 MM/HR
ESTIMATED AVERAGE GLUCOSE: 103 MG/DL
ESTRADIOL SERPL-MCNC: 12 PG/ML
FERRITIN SERPL-MCNC: 64 NG/ML
FOLATE SERPL-MCNC: 7.1 NG/ML
FSH SERPL-MCNC: 6.3 IU/L
GLUCOSE SERPL-MCNC: 90 MG/DL
HAPTOGLOB SERPL-MCNC: 211 MG/DL
HBA1C MFR BLD HPLC: 5.2 %
HCT VFR BLD CALC: 42.6 %
HDLC SERPL-MCNC: 69 MG/DL
HGB BLD-MCNC: 13.8 G/DL
IGF-1 INTERP: NORMAL
IGF-I BLD-MCNC: 162 NG/ML
IMM GRANULOCYTES NFR BLD AUTO: 0.3 %
INR PPP: 1.06 RATIO
IRON SERPL-MCNC: 68 UG/DL
LDH SERPL-CCNC: 152 U/L
LDLC SERPL CALC-MCNC: 240 MG/DL
LH SERPL-ACNC: 5.8 IU/L
LYMPHOCYTES # BLD AUTO: 1.9 K/UL
LYMPHOCYTES NFR BLD AUTO: 25.5 %
MAN DIFF?: NORMAL
MCHC RBC-ENTMCNC: 29.5 PG
MCHC RBC-ENTMCNC: 32.4 GM/DL
MCV RBC AUTO: 91 FL
MONOCYTES # BLD AUTO: 0.45 K/UL
MONOCYTES NFR BLD AUTO: 6 %
NEUTROPHILS # BLD AUTO: 4.99 K/UL
NEUTROPHILS NFR BLD AUTO: 66.8 %
NONHDLC SERPL-MCNC: 260 MG/DL
PFCX: 8.1 %
PLATELET # BLD AUTO: 326 K/UL
POTASSIUM SERPL-SCNC: 4.9 MMOL/L
PROGEST SERPL-MCNC: 0.3 NG/ML
PROLACTIN SERPL-MCNC: 9.8 NG/ML
PROT SERPL-MCNC: 7.7 G/DL
PT BLD: 12.5 SEC
RBC # BLD: 4.68 M/UL
RBC # FLD: 13.3 %
RF+CCP IGG SER-IMP: NEGATIVE
RHEUMATOID FACT SER QL: <10 IU/ML
SODIUM SERPL-SCNC: 138 MMOL/L
T4 FREE SERPL-MCNC: 1.2 NG/DL
TRANSFERRIN SERPL-MCNC: 331 MG/DL
TRIGL SERPL-MCNC: 101 MG/DL
TSH SERPL-ACNC: 1.96 UIU/ML
VIT B12 SERPL-MCNC: 420 PG/ML
WBC # FLD AUTO: 7.46 K/UL

## 2021-08-20 ENCOUNTER — NON-APPOINTMENT (OUTPATIENT)
Age: 36
End: 2021-08-20

## 2021-10-12 ENCOUNTER — NON-APPOINTMENT (OUTPATIENT)
Age: 36
End: 2021-10-12

## 2021-10-12 ENCOUNTER — APPOINTMENT (OUTPATIENT)
Dept: CARDIOLOGY | Facility: CLINIC | Age: 36
End: 2021-10-12
Payer: COMMERCIAL

## 2021-10-12 VITALS
TEMPERATURE: 98.3 F | HEART RATE: 83 BPM | SYSTOLIC BLOOD PRESSURE: 120 MMHG | HEIGHT: 63 IN | DIASTOLIC BLOOD PRESSURE: 79 MMHG | WEIGHT: 162 LBS | OXYGEN SATURATION: 99 % | BODY MASS INDEX: 28.7 KG/M2

## 2021-10-12 DIAGNOSIS — F41.9 ANXIETY DISORDER, UNSPECIFIED: ICD-10-CM

## 2021-10-12 DIAGNOSIS — R79.89 OTHER SPECIFIED ABNORMAL FINDINGS OF BLOOD CHEMISTRY: ICD-10-CM

## 2021-10-12 DIAGNOSIS — R76.8 OTHER SPECIFIED ABNORMAL IMMUNOLOGICAL FINDINGS IN SERUM: ICD-10-CM

## 2021-10-12 DIAGNOSIS — R53.83 OTHER FATIGUE: ICD-10-CM

## 2021-10-12 DIAGNOSIS — E78.00 PURE HYPERCHOLESTEROLEMIA, UNSPECIFIED: ICD-10-CM

## 2021-10-12 PROCEDURE — 99214 OFFICE O/P EST MOD 30 MIN: CPT

## 2021-10-12 PROCEDURE — 93000 ELECTROCARDIOGRAM COMPLETE: CPT

## 2021-10-12 RX ORDER — BENZONATATE 200 MG/1
200 CAPSULE ORAL
Qty: 40 | Refills: 0 | Status: DISCONTINUED | COMMUNITY
Start: 2020-12-31 | End: 2021-10-12

## 2021-10-12 RX ORDER — DEXAMETHASONE 1 MG/1
1 TABLET ORAL
Qty: 1 | Refills: 0 | Status: DISCONTINUED | COMMUNITY
Start: 2020-09-08 | End: 2021-10-12

## 2021-10-12 NOTE — HISTORY OF PRESENT ILLNESS
[FreeTextEntry1] : This is a 36 year old lady who presents today for follow up. Patient previously seen in office 5/2021. Lab work was done which found elevated cholesterol. Patient encouraged to watch diet and increase physical activity, patient states she has always adhered to this lifestyle and cholesterol has always been elevated. Patient with no further issues or concerns for today. Patient denies chest pain, shortness of breath, palpitations, dizziness, vision changes, n/v, abdominal pain, changes in bowel/bladder habits,  or appetite. pt with ocassional fatigue .pt has not taken lexapro  feels improved

## 2021-10-23 LAB
25(OH)D3 SERPL-MCNC: 29.4 NG/ML
ALBUMIN SERPL ELPH-MCNC: 4.2 G/DL
ALP BLD-CCNC: 62 U/L
ALT SERPL-CCNC: 8 U/L
ANA PAT FLD IF-IMP: NORMAL
ANA SER IF-ACNC: ABNORMAL
ANION GAP SERPL CALC-SCNC: 17 MMOL/L
AST SERPL-CCNC: 14 U/L
BASOPHILS # BLD AUTO: 0.01 K/UL
BASOPHILS NFR BLD AUTO: 0.1 %
BILIRUB SERPL-MCNC: 0.2 MG/DL
BUN SERPL-MCNC: 13 MG/DL
CALCIUM SERPL-MCNC: 9.4 MG/DL
CHLORIDE SERPL-SCNC: 104 MMOL/L
CHOLEST SERPL-MCNC: 294 MG/DL
CO2 SERPL-SCNC: 16 MMOL/L
CORTICOSTEROID BIND GLOBULIN: 5.8 MG/DL
CORTIS SERPL-MCNC: 25.4 UG/DL
CORTIS SERPL-MCNC: 33 UG/DL
CORTISOL, FREE: 1.4 UG/DL
CREAT SERPL-MCNC: 0.58 MG/DL
EOSINOPHIL # BLD AUTO: 0.06 K/UL
EOSINOPHIL NFR BLD AUTO: 0.7 %
ERYTHROCYTE [SEDIMENTATION RATE] IN BLOOD BY WESTERGREN METHOD: 37 MM/HR
FERRITIN SERPL-MCNC: 53 NG/ML
FOLATE SERPL-MCNC: 15.2 NG/ML
GLUCOSE SERPL-MCNC: 88 MG/DL
HAPTOGLOB SERPL-MCNC: 191 MG/DL
HCT VFR BLD CALC: 39.4 %
HDLC SERPL-MCNC: 69 MG/DL
HGB BLD-MCNC: 13.1 G/DL
IMM GRANULOCYTES NFR BLD AUTO: 0.2 %
IRON SERPL-MCNC: 87 UG/DL
LDH SERPL-CCNC: 180 U/L
LDLC SERPL CALC-MCNC: 190 MG/DL
LYMPHOCYTES # BLD AUTO: 1.87 K/UL
LYMPHOCYTES NFR BLD AUTO: 22 %
MAN DIFF?: NORMAL
MCHC RBC-ENTMCNC: 29.9 PG
MCHC RBC-ENTMCNC: 33.2 GM/DL
MCV RBC AUTO: 90 FL
METHYLMALONATE SERPL-SCNC: 110 NMOL/L
MONOCYTES # BLD AUTO: 0.36 K/UL
MONOCYTES NFR BLD AUTO: 4.2 %
NEUTROPHILS # BLD AUTO: 6.18 K/UL
NEUTROPHILS NFR BLD AUTO: 72.8 %
NONHDLC SERPL-MCNC: 225 MG/DL
PFCX: 4.2 %
PLATELET # BLD AUTO: 326 K/UL
POTASSIUM SERPL-SCNC: 4.3 MMOL/L
PROT SERPL-MCNC: 7.3 G/DL
RBC # BLD: 4.38 M/UL
RBC # FLD: 13.2 %
SODIUM SERPL-SCNC: 137 MMOL/L
T4 FREE SERPL-MCNC: 1.2 NG/DL
TRANSFERRIN SERPL-MCNC: 343 MG/DL
TRIGL SERPL-MCNC: 174 MG/DL
TSH SERPL-ACNC: 2.83 UIU/ML
VIT B12 SERPL-MCNC: 238 PG/ML
WBC # FLD AUTO: 8.5 K/UL

## 2021-10-29 DIAGNOSIS — R89.9 UNSPECIFIED ABNORMAL FINDING IN SPECIMENS FROM OTHER ORGANS, SYSTEMS AND TISSUES: ICD-10-CM

## 2021-11-02 NOTE — ED PROVIDER NOTE - ATTENDING CONTRIBUTION TO CARE
unchanged
ALEXA Shell MD: Patient seen and evaluated, presents from [facility] with positive Influenza A and sent to r/o pericarditis/myocarditis. Pt began to feel ill  today with diffuse body aches, fever, N, V, leg pains and had 3 episodes of sharp CP that lasted for a few seconds and resolved. Pt went to [facility] and had CXR and EKG and sent to the ED for evaluation. Pt has not had any more CP, when she had the CP nothing made it better or worse and it was short lived, no SOB. Pt never gets a flu shot, no one else is sick at home. Pt appears well, neck supple, lungs clear, S1S2, abd SNT, non focal neuro. EKG is sinus with no evidence of pericarditis or myocarditis. Will give IVF, tylenol, zofran and will reassess.

## 2021-11-10 ENCOUNTER — APPOINTMENT (OUTPATIENT)
Dept: ENDOCRINOLOGY | Facility: CLINIC | Age: 36
End: 2021-11-10
Payer: COMMERCIAL

## 2021-11-10 VITALS
HEIGHT: 63 IN | WEIGHT: 162 LBS | TEMPERATURE: 98.2 F | DIASTOLIC BLOOD PRESSURE: 78 MMHG | BODY MASS INDEX: 28.7 KG/M2 | SYSTOLIC BLOOD PRESSURE: 118 MMHG | OXYGEN SATURATION: 98 % | HEART RATE: 88 BPM

## 2021-11-10 DIAGNOSIS — E53.8 DEFICIENCY OF OTHER SPECIFIED B GROUP VITAMINS: ICD-10-CM

## 2021-11-10 DIAGNOSIS — L70.0 ACNE VULGARIS: ICD-10-CM

## 2021-11-10 DIAGNOSIS — L65.9 NONSCARRING HAIR LOSS, UNSPECIFIED: ICD-10-CM

## 2021-11-10 DIAGNOSIS — E55.9 VITAMIN D DEFICIENCY, UNSPECIFIED: ICD-10-CM

## 2021-11-10 PROCEDURE — 99214 OFFICE O/P EST MOD 30 MIN: CPT

## 2021-11-10 RX ORDER — ERGOCALCIFEROL 1.25 MG/1
1.25 MG CAPSULE, LIQUID FILLED ORAL
Qty: 12 | Refills: 0 | Status: ACTIVE | COMMUNITY
Start: 2020-10-09 | End: 1900-01-01

## 2021-11-11 ENCOUNTER — NON-APPOINTMENT (OUTPATIENT)
Age: 36
End: 2021-11-11

## 2021-11-14 NOTE — HISTORY OF PRESENT ILLNESS
[FreeTextEntry1] : Ms. SIGALA  is a 36 year old female  who returns with regard to a history of difficulties with hair thinning and acne.  Has had hair thinning which had worsened pre last visit last December.  Too has had acne not well controlled on Spironolactone and tretinoin along with past use of clindamycin.  She has been on Junel-ocp in addition. Had bald patch left posterior region of scalp-had this prior- each time improved with a corticosteroid injection.\par Denies hirsutism.\par Menses have been regular\par 24 hr urine cortisol wnl\par B-12 replacement- Vit d 25-OH low in past\par Was on b-12 but ran out. Too, on vitamin D3 2,000 daily. Takes a multi.\par Prior testosterone and Dheas wnl.\par Spironolactone 100 mg daily.\par On Junel(restart) since 2017.\par Besides the alopecia -too has diffuse hair thinning.\par Acne mild and stress related-facial-jaw and chin area.\par On OCP, no menses but occasional spotting.\par Denies overt cushingoid s/s.  Does tell of wt gain in abd area-does wt lift-struggling ty loose weight of late. has stress acne, heartburn, melasma and headaches.\par Headaches frontal region\par Had  in past 5 d per week-not so of late -no time\par Menses were eg of ocp. were lasting 8 days\par notes fatigue, wt gain low energy acne heartburn hair thinning\par Anxious frustrated\par hair coming out somewhat more.\par Acne stable\par ? goiter noted today per Dr. Edouard.\par She reports of frequent bathroom usage.

## 2022-01-21 ENCOUNTER — APPOINTMENT (OUTPATIENT)
Dept: RHEUMATOLOGY | Facility: CLINIC | Age: 37
End: 2022-01-21

## 2022-01-31 ENCOUNTER — RX RENEWAL (OUTPATIENT)
Age: 37
End: 2022-01-31

## 2022-01-31 RX ORDER — MAGNESIUM 200 MG
1000 TABLET ORAL
Qty: 45 | Refills: 0 | Status: ACTIVE | COMMUNITY
Start: 2020-12-14 | End: 1900-01-01

## 2022-02-04 DIAGNOSIS — B00.9 HERPESVIRAL INFECTION, UNSPECIFIED: ICD-10-CM

## 2022-02-04 RX ORDER — ACYCLOVIR 50 MG/G
5 OINTMENT TOPICAL
Qty: 1 | Refills: 3 | Status: ACTIVE | COMMUNITY
Start: 2022-02-04 | End: 1900-01-01

## 2022-02-04 RX ORDER — VALACYCLOVIR 1 G/1
1 TABLET, FILM COATED ORAL
Qty: 4 | Refills: 3 | Status: ACTIVE | COMMUNITY
Start: 2022-02-04 | End: 1900-01-01

## 2022-02-11 ENCOUNTER — APPOINTMENT (OUTPATIENT)
Dept: CARDIOLOGY | Facility: CLINIC | Age: 37
End: 2022-02-11

## 2022-02-23 ENCOUNTER — NON-APPOINTMENT (OUTPATIENT)
Age: 37
End: 2022-02-23

## 2022-03-17 ENCOUNTER — RESULT REVIEW (OUTPATIENT)
Age: 37
End: 2022-03-17

## 2022-03-29 ENCOUNTER — APPOINTMENT (OUTPATIENT)
Dept: CARDIOLOGY | Facility: CLINIC | Age: 37
End: 2022-03-29

## 2022-04-29 ENCOUNTER — EMERGENCY (EMERGENCY)
Facility: HOSPITAL | Age: 37
LOS: 1 days | Discharge: HOME | End: 2022-04-29
Attending: STUDENT IN AN ORGANIZED HEALTH CARE EDUCATION/TRAINING PROGRAM | Admitting: EMERGENCY MEDICINE
Payer: COMMERCIAL

## 2022-04-29 VITALS
RESPIRATION RATE: 16 BRPM | TEMPERATURE: 98 F | DIASTOLIC BLOOD PRESSURE: 73 MMHG | SYSTOLIC BLOOD PRESSURE: 125 MMHG | HEART RATE: 119 BPM | OXYGEN SATURATION: 100 %

## 2022-04-29 LAB
ALBUMIN SERPL ELPH-MCNC: 4.5 G/DL — SIGNIFICANT CHANGE UP (ref 3.3–5)
ALP SERPL-CCNC: 78 U/L — SIGNIFICANT CHANGE UP (ref 40–120)
ALT FLD-CCNC: 11 U/L — SIGNIFICANT CHANGE UP (ref 4–33)
ANION GAP SERPL CALC-SCNC: 17 MMOL/L — HIGH (ref 7–14)
APTT BLD: 28.8 SEC — SIGNIFICANT CHANGE UP (ref 27–36.3)
AST SERPL-CCNC: 15 U/L — SIGNIFICANT CHANGE UP (ref 4–32)
BASOPHILS # BLD AUTO: 0.02 K/UL — SIGNIFICANT CHANGE UP (ref 0–0.2)
BASOPHILS NFR BLD AUTO: 0.2 % — SIGNIFICANT CHANGE UP (ref 0–2)
BILIRUB SERPL-MCNC: <0.2 MG/DL — SIGNIFICANT CHANGE UP (ref 0.2–1.2)
BUN SERPL-MCNC: 14 MG/DL — SIGNIFICANT CHANGE UP (ref 7–23)
CALCIUM SERPL-MCNC: 9.2 MG/DL — SIGNIFICANT CHANGE UP (ref 8.4–10.5)
CHLORIDE SERPL-SCNC: 103 MMOL/L — SIGNIFICANT CHANGE UP (ref 98–107)
CO2 SERPL-SCNC: 19 MMOL/L — LOW (ref 22–31)
CREAT SERPL-MCNC: 0.69 MG/DL — SIGNIFICANT CHANGE UP (ref 0.5–1.3)
EGFR: 116 ML/MIN/1.73M2 — SIGNIFICANT CHANGE UP
EOSINOPHIL # BLD AUTO: 0.06 K/UL — SIGNIFICANT CHANGE UP (ref 0–0.5)
EOSINOPHIL NFR BLD AUTO: 0.5 % — SIGNIFICANT CHANGE UP (ref 0–6)
FLUAV AG NPH QL: SIGNIFICANT CHANGE UP
FLUBV AG NPH QL: SIGNIFICANT CHANGE UP
GLUCOSE SERPL-MCNC: 101 MG/DL — HIGH (ref 70–99)
HCT VFR BLD CALC: 42.3 % — SIGNIFICANT CHANGE UP (ref 34.5–45)
HGB BLD-MCNC: 14 G/DL — SIGNIFICANT CHANGE UP (ref 11.5–15.5)
IANC: 9.05 K/UL — HIGH (ref 1.8–7.4)
IMM GRANULOCYTES NFR BLD AUTO: 0.3 % — SIGNIFICANT CHANGE UP (ref 0–1.5)
INR BLD: 1.11 RATIO — SIGNIFICANT CHANGE UP (ref 0.88–1.16)
LYMPHOCYTES # BLD AUTO: 23.6 % — SIGNIFICANT CHANGE UP (ref 13–44)
LYMPHOCYTES # BLD AUTO: 3.03 K/UL — SIGNIFICANT CHANGE UP (ref 1–3.3)
MCHC RBC-ENTMCNC: 29.5 PG — SIGNIFICANT CHANGE UP (ref 27–34)
MCHC RBC-ENTMCNC: 33.1 GM/DL — SIGNIFICANT CHANGE UP (ref 32–36)
MCV RBC AUTO: 89.1 FL — SIGNIFICANT CHANGE UP (ref 80–100)
MONOCYTES # BLD AUTO: 0.65 K/UL — SIGNIFICANT CHANGE UP (ref 0–0.9)
MONOCYTES NFR BLD AUTO: 5.1 % — SIGNIFICANT CHANGE UP (ref 2–14)
NEUTROPHILS # BLD AUTO: 9.05 K/UL — HIGH (ref 1.8–7.4)
NEUTROPHILS NFR BLD AUTO: 70.3 % — SIGNIFICANT CHANGE UP (ref 43–77)
NRBC # BLD: 0 /100 WBCS — SIGNIFICANT CHANGE UP
NRBC # FLD: 0 K/UL — SIGNIFICANT CHANGE UP
PLATELET # BLD AUTO: 403 K/UL — HIGH (ref 150–400)
POTASSIUM SERPL-MCNC: 4 MMOL/L — SIGNIFICANT CHANGE UP (ref 3.5–5.3)
POTASSIUM SERPL-SCNC: 4 MMOL/L — SIGNIFICANT CHANGE UP (ref 3.5–5.3)
PROT SERPL-MCNC: 8.1 G/DL — SIGNIFICANT CHANGE UP (ref 6–8.3)
PROTHROM AB SERPL-ACNC: 12.9 SEC — SIGNIFICANT CHANGE UP (ref 10.5–13.4)
RBC # BLD: 4.75 M/UL — SIGNIFICANT CHANGE UP (ref 3.8–5.2)
RBC # FLD: 13.1 % — SIGNIFICANT CHANGE UP (ref 10.3–14.5)
RSV RNA NPH QL NAA+NON-PROBE: SIGNIFICANT CHANGE UP
SARS-COV-2 RNA SPEC QL NAA+PROBE: SIGNIFICANT CHANGE UP
SODIUM SERPL-SCNC: 139 MMOL/L — SIGNIFICANT CHANGE UP (ref 135–145)
TROPONIN T, HIGH SENSITIVITY RESULT: <6 NG/L — SIGNIFICANT CHANGE UP
WBC # BLD: 12.85 K/UL — HIGH (ref 3.8–10.5)
WBC # FLD AUTO: 12.85 K/UL — HIGH (ref 3.8–10.5)

## 2022-04-29 PROCEDURE — 70498 CT ANGIOGRAPHY NECK: CPT | Mod: 26,MA

## 2022-04-29 PROCEDURE — 99220: CPT

## 2022-04-29 PROCEDURE — 70496 CT ANGIOGRAPHY HEAD: CPT | Mod: 26,MA

## 2022-04-29 PROCEDURE — 0042T: CPT

## 2022-04-29 PROCEDURE — 99284 EMERGENCY DEPT VISIT MOD MDM: CPT

## 2022-04-29 RX ORDER — SPIRONOLACTONE 25 MG/1
150 TABLET, FILM COATED ORAL
Qty: 0 | Refills: 0 | DISCHARGE

## 2022-04-29 RX ORDER — METOCLOPRAMIDE HCL 10 MG
10 TABLET ORAL ONCE
Refills: 0 | Status: COMPLETED | OUTPATIENT
Start: 2022-04-29 | End: 2022-04-29

## 2022-04-29 RX ORDER — SODIUM CHLORIDE 9 MG/ML
1000 INJECTION INTRAMUSCULAR; INTRAVENOUS; SUBCUTANEOUS ONCE
Refills: 0 | Status: COMPLETED | OUTPATIENT
Start: 2022-04-29 | End: 2022-04-29

## 2022-04-29 RX ORDER — SPIRONOLACTONE 25 MG/1
150 TABLET, FILM COATED ORAL
Refills: 0 | Status: DISCONTINUED | OUTPATIENT
Start: 2022-04-29 | End: 2022-05-03

## 2022-04-29 RX ADMIN — Medication 10 MILLIGRAM(S): at 17:45

## 2022-04-29 RX ADMIN — SODIUM CHLORIDE 1000 MILLILITER(S): 9 INJECTION INTRAMUSCULAR; INTRAVENOUS; SUBCUTANEOUS at 17:45

## 2022-04-29 RX ADMIN — SPIRONOLACTONE 150 MILLIGRAM(S): 25 TABLET, FILM COATED ORAL at 22:27

## 2022-04-29 NOTE — ED CDU PROVIDER INITIAL DAY NOTE - NSICDXPASTMEDICALHX_GEN_ALL_CORE_FT
PAST MEDICAL HISTORY:  HLD (hyperlipidemia)      PAST MEDICAL HISTORY:  History of alopecia     HLD (hyperlipidemia)

## 2022-04-29 NOTE — ED CDU PROVIDER INITIAL DAY NOTE - DETAILS
Tele monitoring, neuro checks, MRIs per orders, Neuro team following patient; supportive care, general observation care / monitoring.

## 2022-04-29 NOTE — ED CDU PROVIDER INITIAL DAY NOTE - NSSUBSTANCEUSE_GEN_ALL_CORE_SD
What Type Of Note Output Would You Prefer (Optional)?: Standard Output How Severe Is Your Rash?: moderate Is This A New Presentation, Or A Follow-Up?: Rash never used

## 2022-04-29 NOTE — CONSULT NOTE ADULT - ATTENDING COMMENTS
DDx: migraine; acute ischemic stroke seems less likely.  I agree with work up and management as above.  D/W patient  Thank you

## 2022-04-29 NOTE — ED CDU PROVIDER INITIAL DAY NOTE - CROS ED GI ALL NEG
Inform patient that records from [facility], and [facility] have been reviewed and there is no diagnosis of cancer noted in any of her charts. At this time as patient is being dismissed from my practice, patient should discuss further referral or procedures for gender reassignment with new PCP. Happy to address any acute concerns.     negative...

## 2022-04-29 NOTE — CONSULT NOTE ADULT - ASSESSMENT
Patient is a 38yo F with hx of HLD (not on any medications) who presented to ED and neurology consulted as CODE STROKE. Onset of headache this AM followed by LUE/LLE tingling/numbness distally and subjective weakness. Felt that her L arm and leg were heavier. Constant bifrontal headache since this AM, improved in intensity by time of ED presentation. CT imaging with no acute pathology. Neuro exam with ? mild L sided weakness, however possible component of giveaway weakness noted on testing. Patient was deemed not a candidate for tpa due to mild non-disabling deficits on the motor exam    Impression   Mild L hemiparesis and headache possibly 2/2 migraine. Low suspicion for acute ischemic stroke however need to r/o. r/o VST as well     Recommendation  - CDU admission  - MR brain without contrast   - MRV H/N   - Start ASA 81mg   - A1C, Lipid profile   - Start Atorvastatin 40mg IF indicated by ASCVD2 score   - TTE   - Telemetry   - PT/OT   - Headache management with IV tylenol prn     Case discussed with neurology attending Dr. Biswas

## 2022-04-29 NOTE — ED PROVIDER NOTE - ATTENDING CONTRIBUTION TO CARE
I have personally performed a face to face medical and diagnostic evaluation of the patient. I have discussed with and reviewed the Resident's note and agree with the History, ROS, Physical Exam and MDM unless otherwise indicated. A brief summary of my personal evaluation and impression can be found below.    38yo F hx hld p/w LUE/LLE weakness w/ frontal HA starting 12pm today. No hx of similar episodes, no trauma. Also w/ intermittent b/l blury vision and nausea. No fever, chills, cp, sob, extremity pain, focal numbness. Of note on OCP. R hand dominant.  VITALS: Initial triage and subsequent vitals have been reviewed by me.  Gen: Well appearing, NAD, alert, non-toxic  Head: NCAT  HEENT: PERRL, MMM, normal conjunctiva, anicteric, neck supple  Lung: CTAB, no adventitious sounds  CV: RRR, no murmurs, 2+symmetric peripheral pulses  Abd: soft, NTND, no rebound or guarding, no palpable masses  MSK: No edema, no visible deformities  Neuro: Following commands appropriately, fluid speech, CN II-XII grossly intact. 5/5 strength and normal sensation in all extremities but trace dec strength in LUE/LLE. Ambulatory with stable gait.  Skin: Warm and dry, no evidence of rash  Psych: normal mood and affect  CVA v complex migraine v poss venous thrombosis will get stroke w/u in coordination with neuro and r/a. Will likely req further w/u

## 2022-04-29 NOTE — ED ADULT TRIAGE NOTE - CHIEF COMPLAINT QUOTE
c/o frontal headache, left sided body tingling and numbness all the way to foot , Left sided body weakness since 11:30 am . FS=98. States speech feels slow

## 2022-04-29 NOTE — ED ADULT NURSE NOTE - OBJECTIVE STATEMENT
Pt a&ox3, h/o hld, presents with headache, weakness/tingling to Lt arm and Lt leg starting at approximately at 1300 this afternoon, pt endorses blurry vision and nausea, breathing even and unlabored, denies chest pain, no abd pain, afebrile, brought directly to CT scan, ED/neuro/pharmacy at bedside, 18g IVL placed to rt arm, labs collected and sent, will continue to monitor.

## 2022-04-29 NOTE — ED PROVIDER NOTE - PHYSICAL EXAMINATION
Physical Exam:  Gen: awake alert   HEENT: EOMI, normal conjunctiva, oral mucosa moist  Lung: CTAB, no respiratory distress, no wheezes/rhonchi/rales B/L, speaking in full sentences  CV: RRR  Abd: soft, NT, ND, no guarding, no rigidity, no rebound tenderness  MSK: no visible deformities  Neuro: L arm drift, no facial droop, L leg drift, strength 4/5 in L UE/LE, No focal sensory deficits  Skin: Warm, well perfused, no rash, no leg swelling  Psych: normal affect, calm  ~Scott Chopra MD (PGY-2)

## 2022-04-29 NOTE — ED CDU PROVIDER INITIAL DAY NOTE - PHYSICAL EXAMINATION
CONSTITUTIONAL:  Well appearing, awake, alert, oriented to person, place, time/situation and in no apparent distress.  Pt. is objectively comfortable appearing and verbalizing in full, clear, effortless sentences.  ENMT: NC/AT.  Airway patent.  Nasal mucosa clear.  Moist mucous membranes.  Neck supple.  EYES:  Clear OU.  CARDIAC:  Normal rate, regular rhythm.  Heart sounds S1 S2.  No murmurs, gallops, or rubs.  RESPIRATORY:  Breath sounds clear and equal bilaterally.  No wheezes, no rales, no rhonchi.  GASTROINTESTINAL:  Abdomen soft, non-distended, non-tender.  No rebound, no guarding.  NEUROLOGICAL:  Alert and oriented to person/place/time/situation.  LUE/LLE strength slightly decreased (4/5) vs RUE/RLE (5/5). No other focal deficits; no tremors noted.   MUSCULOSKELETAL:  Range of motion is not limited.   SKIN:  Skin color unremarkable.  Skin warm, dry, and intact.    PSYCHIATRIC:  Alert and oriented to person/place/time/situation.  Mood and affect WNL.  No apparent risk to self or others.

## 2022-04-29 NOTE — CONSULT NOTE ADULT - SUBJECTIVE AND OBJECTIVE BOX
HPI:  Patient is a 36yo F with hx of HLD (not on any medications) who presented to ED and neurology consulted as CODE STROKE. Patient endorses bifrontal headache when she woke this AM. Shortly after 1pm patient noted L arm and L leg tingling. She also felt that her L arm and leg were heavier. She denies hx of headache or migraines. Did not attempt any therapy/treatment at home. Presented to ED due to concern of this weakness. Patient denies hx of similar symptoms or stroke. Takes OCPs and spironolactone.  Denies vision changes, dizziness, lightheadedness, vomiting, cp, sob.     LKN: 1pm 4/29/22  NIHSS 2, premrs 0  Patient not a candidate for tpa due to mild non-disabling deficits on exam   Patient not a candidate for thrombectomy as no lvo on cta     ROS: A 10-system ROS was performed and is negative except for those items noted above and/or in the HPI.    PAST MEDICAL & SURGICAL HISTORY:  HLD (hyperlipidemia)    No significant past surgical history      FAMILY HISTORY:      SOCIAL HISTORY: SOCIAL HISTORY:     Marital Status: (  )   (  ) Single  (  )   (  )      Occupation:      Lives: (  ) alone  (  ) with children   (  ) with spouse  (  ) with parents  (  ) other     Illicit Drug Use: (  ) never used  (  ) other _____     Tobacco Use:  (  ) never smoked  (  ) former smoker  (  ) current smoker  (  ) pack year  (  ) last cigarette date     Alcohol Use:      Sexual History:        MEDICATIONS  Home Medications:      MEDICATIONS  (STANDING):    MEDICATIONS  (PRN):      ALLERGIES/INTOLERANCES:  Allergies  No Known Allergies    Intolerances      OBJECTIVE:  VITALS   Vital Signs Last 24 Hrs  T(C): 36.6 (29 Apr 2022 16:42), Max: 36.6 (29 Apr 2022 16:42)  T(F): 97.9 (29 Apr 2022 16:42), Max: 97.9 (29 Apr 2022 16:42)  HR: 119 (29 Apr 2022 16:42) (119 - 119)  BP: 125/73 (29 Apr 2022 16:42) (125/73 - 125/73)  BP(mean): --  RR: 16 (29 Apr 2022 16:42) (16 - 16)  SpO2: 100% (29 Apr 2022 16:42) (100% - 100%)    PHYSICAL EXAM:  General: Well developed, in NAD   Head: atraumatic   Respiratory: non-laboured breathing, regular rate  GI: Nondistended   Integumentary: Warm and Dry   Psychiatric: Normal Affect     Neurological Exam:  Mental Status: Orientated to self, date and place.  Attention intact.  No dysarthria, or neglect.  Comprehension intact to naming. Knowledge intact.  Registration intact.  Short and long term memory grossly intact.    Cranial Nerves: PERRL, EOMI, VFF, no nystagmus.  CN V1-3 intact to light touch.  No facial asymmetry.  Hearing intact.  Tongue midline.  Sternocleidomastoid and Trapezius intact bilaterally.  Motor:   Tone: normal            Strength:   component of give-away weakness noted on exam  Upper extremity                      Delt       Bicep    Tricep                                               R  4+/5        5/5       4+/5       5/5                                            L    5/5        5/5        5/5       5/5  Lower extremity                       HF          KE          KF        DF         PF                                            R    4+/5        5/5        5/5       5/5       5/5                                            L         5/5        5/5       5/5       5/5        5/5  Pronator drift: none                 Dysmetria: None to finger-nose-finger or heel-shin-heel  No truncal ataxia.    Tremor: No resting, postural or action tremor.  No myoclonus.  Sensation: intact to light touch  Deep Tendon Reflexes: 2+ bilateral biceps, triceps, brachioradialis, knee and ankle  Toes mute bilaterally  Gait: normal and stable.      LABORATORY:  CBC                       14.0   12.85 )-----------( 403      ( 29 Apr 2022 17:18 )             42.3     Chem 04-29    139  |  103  |  14  ----------------------------<  101<H>  4.0   |  19<L>  |  0.69    Ca    9.2      29 Apr 2022 17:18    TPro  8.1  /  Alb  4.5  /  TBili  <0.2  /  DBili  x   /  AST  15  /  ALT  11  /  AlkPhos  78  04-29    LFTs LIVER FUNCTIONS - ( 29 Apr 2022 17:18 )  Alb: 4.5 g/dL / Pro: 8.1 g/dL / ALK PHOS: 78 U/L / ALT: 11 U/L / AST: 15 U/L / GGT: x           Coagulopathy PT/INR - ( 29 Apr 2022 17:18 )   PT: 12.9 sec;   INR: 1.11 ratio         PTT - ( 29 Apr 2022 17:18 )  PTT:28.8 sec  Lipid Panel   A1c   Cardiac enzymes     U/A   CSF  Immunological  Other    STUDIES & IMAGING:  Studies (EKG, EEG, EMG, etc):       Radiology (XR, CT, MR, U/S, TTE/ABUNDIO):

## 2022-04-29 NOTE — ED CDU PROVIDER INITIAL DAY NOTE - OBJECTIVE STATEMENT
37F PMH HLD p/w L sided weakness. Had frontal headache starting around 11:30AM. Note to have L sided weakness and L arm tingling (elbow to hand) around 1PM. Also endorsing intermittent b/l blurry vision and nausea w/o vomiting. No f/c, chest pain, SOB, extremity pain    CDU [facility] Note-----  ED Provider HPI reviewed as above.  Pt is a 36 yo female, PMH hyperlipidemia (no meds), and alopecia (on spironolactone), no stated PSH; pt presented to the ED c/o frontal headache earlier today ~1130 hrs and LUE/LLE weakness a/w LUE paresthesias ~1300 hrs.  No hx/o trauma or recent illness.  Pt is on oral contraceptives daily.    In the ED, pt was evaluated by Neurology as code stroke, and had CT head, CTA head/neck, and CT perfusion studies performed, all with no acute pathology noted.  Labs significant for WBC 12.85, CMP: anion gap 17, bicarb 19, CMP otherwise unremarkable, troponin <6.  MRI head/MRV head was advised; pt was dispo'd to CDU for continued care plan:  Tele monitoring, neuro checks, MRIs per orders, Neuro team following patient; supportive care, general observation care / monitoring.  On CDU evaluation, pt denies active headache or vision changes; no nausea or other discomfort.  Pt. states she still feels slight residual weakness in her LUE and LLE, generalized.  Pt denies dizziness, ataxia, LOC; no other c/o.  CDU care plan d/w pt who verbalizes agreement with plan.

## 2022-04-29 NOTE — ED PROVIDER NOTE - OBJECTIVE STATEMENT
37F PMH HLD p/w L sided weakness. Had frontal headache starting around 11:30AM. Note to have L sided weakness and L arm tingling (elbow to hand) around 1PM. Also endorsing intermittent b/l blurry vision and nausea w/o vomiting. No f/c, chest pain, SOB, extremity pain

## 2022-04-30 VITALS
HEART RATE: 82 BPM | RESPIRATION RATE: 17 BRPM | DIASTOLIC BLOOD PRESSURE: 83 MMHG | TEMPERATURE: 98 F | OXYGEN SATURATION: 96 % | SYSTOLIC BLOOD PRESSURE: 123 MMHG

## 2022-04-30 LAB
ANION GAP SERPL CALC-SCNC: 12 MMOL/L — SIGNIFICANT CHANGE UP (ref 7–14)
BASOPHILS # BLD AUTO: 0.01 K/UL — SIGNIFICANT CHANGE UP (ref 0–0.2)
BASOPHILS NFR BLD AUTO: 0.1 % — SIGNIFICANT CHANGE UP (ref 0–2)
BUN SERPL-MCNC: 9 MG/DL — SIGNIFICANT CHANGE UP (ref 7–23)
CALCIUM SERPL-MCNC: 8.6 MG/DL — SIGNIFICANT CHANGE UP (ref 8.4–10.5)
CHLORIDE SERPL-SCNC: 104 MMOL/L — SIGNIFICANT CHANGE UP (ref 98–107)
CHOLEST SERPL-MCNC: 263 MG/DL — HIGH
CO2 SERPL-SCNC: 20 MMOL/L — LOW (ref 22–31)
CREAT SERPL-MCNC: 0.58 MG/DL — SIGNIFICANT CHANGE UP (ref 0.5–1.3)
EGFR: 121 ML/MIN/1.73M2 — SIGNIFICANT CHANGE UP
EOSINOPHIL # BLD AUTO: 0.11 K/UL — SIGNIFICANT CHANGE UP (ref 0–0.5)
EOSINOPHIL NFR BLD AUTO: 1.2 % — SIGNIFICANT CHANGE UP (ref 0–6)
GLUCOSE SERPL-MCNC: 80 MG/DL — SIGNIFICANT CHANGE UP (ref 70–99)
HCT VFR BLD CALC: 37.8 % — SIGNIFICANT CHANGE UP (ref 34.5–45)
HDLC SERPL-MCNC: 60 MG/DL — SIGNIFICANT CHANGE UP
HGB BLD-MCNC: 12.3 G/DL — SIGNIFICANT CHANGE UP (ref 11.5–15.5)
IANC: 5.24 K/UL — SIGNIFICANT CHANGE UP (ref 1.8–7.4)
IMM GRANULOCYTES NFR BLD AUTO: 0.4 % — SIGNIFICANT CHANGE UP (ref 0–1.5)
LIPID PNL WITH DIRECT LDL SERPL: 163 MG/DL — HIGH
LYMPHOCYTES # BLD AUTO: 3.06 K/UL — SIGNIFICANT CHANGE UP (ref 1–3.3)
LYMPHOCYTES # BLD AUTO: 34.2 % — SIGNIFICANT CHANGE UP (ref 13–44)
MCHC RBC-ENTMCNC: 29.2 PG — SIGNIFICANT CHANGE UP (ref 27–34)
MCHC RBC-ENTMCNC: 32.5 GM/DL — SIGNIFICANT CHANGE UP (ref 32–36)
MCV RBC AUTO: 89.8 FL — SIGNIFICANT CHANGE UP (ref 80–100)
MONOCYTES # BLD AUTO: 0.5 K/UL — SIGNIFICANT CHANGE UP (ref 0–0.9)
MONOCYTES NFR BLD AUTO: 5.6 % — SIGNIFICANT CHANGE UP (ref 2–14)
NEUTROPHILS # BLD AUTO: 5.24 K/UL — SIGNIFICANT CHANGE UP (ref 1.8–7.4)
NEUTROPHILS NFR BLD AUTO: 58.5 % — SIGNIFICANT CHANGE UP (ref 43–77)
NON HDL CHOLESTEROL: 203 MG/DL — HIGH
NRBC # BLD: 0 /100 WBCS — SIGNIFICANT CHANGE UP
NRBC # FLD: 0 K/UL — SIGNIFICANT CHANGE UP
PLATELET # BLD AUTO: 294 K/UL — SIGNIFICANT CHANGE UP (ref 150–400)
POTASSIUM SERPL-MCNC: 3.9 MMOL/L — SIGNIFICANT CHANGE UP (ref 3.5–5.3)
POTASSIUM SERPL-SCNC: 3.9 MMOL/L — SIGNIFICANT CHANGE UP (ref 3.5–5.3)
RBC # BLD: 4.21 M/UL — SIGNIFICANT CHANGE UP (ref 3.8–5.2)
RBC # FLD: 13.2 % — SIGNIFICANT CHANGE UP (ref 10.3–14.5)
SODIUM SERPL-SCNC: 136 MMOL/L — SIGNIFICANT CHANGE UP (ref 135–145)
TRIGL SERPL-MCNC: 198 MG/DL — HIGH
WBC # BLD: 8.96 K/UL — SIGNIFICANT CHANGE UP (ref 3.8–10.5)
WBC # FLD AUTO: 8.96 K/UL — SIGNIFICANT CHANGE UP (ref 3.8–10.5)

## 2022-04-30 PROCEDURE — 70551 MRI BRAIN STEM W/O DYE: CPT | Mod: 26,MG

## 2022-04-30 PROCEDURE — 99217: CPT

## 2022-04-30 PROCEDURE — G1004: CPT

## 2022-04-30 NOTE — ED CDU PROVIDER DISPOSITION NOTE - NSFOLLOWUPINSTRUCTIONS_ED_ALL_ED_FT
Acute Headache    WHAT YOU NEED TO KNOW:    An acute headache is pain or discomfort that starts suddenly and gets worse quickly. You may have an acute headache only when you feel stress or eat certain foods. Other acute headache pain can happen every day, and sometimes several times a day.     DISCHARGE INSTRUCTIONS:    Seek care immediately if:   •You have severe pain.      •You have numbness or weakness on one side of your face or body.      •You have a headache that occurs after a blow to the head, a fall, or other trauma.       •You have a headache, are forgetful or confused, or have trouble speaking.      •You have a headache, stiff neck, and a fever.      Contact your healthcare provider if:   •You have a constant headache and are vomiting.      •You have a headache each day that does not get better, even after treatment.      •You have changes in your headaches, or new symptoms that occur when you have a headache.      •You have questions or concerns about your condition or care.      Medicines: You may need any of the following:   •Prescription pain medicine may be given. The medicine your healthcare provider recommends will depend on the kind of headaches you have. You will need to take prescription headache medicines as directed to prevent a problem called rebound headache. These headaches happen with regular use of pain relievers for headache disorders.      •NSAIDs, such as ibuprofen, help decrease swelling, pain, and fever. This medicine is available with or without a doctor's order. NSAIDs can cause stomach bleeding or kidney problems in certain people. If you take blood thinner medicine, always ask your healthcare provider if NSAIDs are safe for you. Always read the medicine label and follow directions.      •Acetaminophen decreases pain and fever. It is available without a doctor's order. Ask how much to take and how often to take it. Follow directions. Read the labels of all other medicines you are using to see if they also contain acetaminophen, or ask your doctor or pharmacist. Acetaminophen can cause liver damage if not taken correctly. Do not use more than 3 grams (3,000 milligrams) total of acetaminophen in one day.       •Antidepressants may be given for some kinds of headaches.       •Take your medicine as directed. Contact your healthcare provider if you think your medicine is not helping or if you have side effects. Tell him or her if you are allergic to any medicine. Keep a list of the medicines, vitamins, and herbs you take. Include the amounts, and when and why you take them. Bring the list or the pill bottles to follow-up visits. Carry your medicine list with you in case of an emergency.      Manage your symptoms:   •Apply heat or ice on the headache area. Use a heat or ice pack. For an ice pack, you can also put crushed ice in a plastic bag. Cover the pack or bag with a towel before you apply it to your skin. Ice and heat both help decrease pain, and heat also helps decrease muscle spasms. Apply heat for 20 to 30 minutes every 2 hours. Apply ice for 15 to 20 minutes every hour. Apply heat or ice for as long and for as many days as directed. You may alternate heat and ice.      •Relax your muscles. Lie down in a comfortable position and close your eyes. Relax your muscles slowly. Start at your toes and work your way up your body.      •Keep a record of your headaches. Write down when your headaches start and stop. Include your symptoms and what you were doing when the headache began. Record what you ate or drank for 24 hours before the headache started. Describe the pain and where it hurts. Keep track of what you did to treat your headache and if it worked.       Prevent an acute headache:   •Avoid anything that triggers an acute headache. Examples include exposure to chemicals, going to high altitude, or not getting enough sleep. Create a regular sleep routine. Go to sleep at the same time and wake up at the same time each day. Do not use electronic devices before bedtime. These may trigger a headache or prevent you from sleeping well.      •Do not smoke. Nicotine and other chemicals in cigarettes and cigars can trigger an acute headache or make it worse. Ask your healthcare provider for information if you currently smoke and need help to quit. E-cigarettes or smokeless tobacco still contain nicotine. Talk to your healthcare provider before you use these products.       •Limit alcohol as directed. Alcohol can trigger an acute headache or make it worse. If you have cluster headaches, do not drink alcohol during an episode. For other types of headaches, ask your healthcare provider if it is safe for you to drink alcohol. Ask how much is safe for you to drink, and how often.      •Exercise as directed. Exercise can reduce tension and help with headache pain. Aim for 30 minutes of physical activity on most days of the week. Your healthcare provider can help you create an exercise plan.      •Eat a variety of healthy foods. Healthy foods include fruits, vegetables, low-fat dairy products, lean meats, fish, whole grains, and cooked beans. Your healthcare provider or dietitian can help you create meals plans if you need to avoid foods that trigger headaches.      Follow up with your healthcare provider as directed: Bring your headache record with you when you see your healthcare provider. Write down your questions so you remember to ask them during your visits.      Dolor de jace ashley    LO QUE NECESITA SABER:    El dolor de jace ashley es un dolor o molestia que comienza de repente y empeora rápidamente. Usted puede tener un dolor de jace ashley sólo cuando siente estrés o come ciertos alimentos. Otro tipo dolor de jace ashley puede producirse todos los días y a veces varias veces al día.    INSTRUCCIONES SOBRE EL NOMI HOSPITALARIA:    Busque atención médica de inmediato si:  •Usted tiene dolor intenso.      •Usted tiene entumecimiento en un lado de up sadaf o cuerpo.      •Usted tiene un dolor de jace que ocurre después de un golpe en la jace, hollie caída u otro trauma.      •Tiene dolor de jace, está olvidadizo o confundido o tiene dificultad para hablar.      •Tiene dolor de jace, rigidez en el severo y fiebre.      Comuníquese con up médico si:  •Usted tiene un dolor de jace josh y está vomitando.      •Usted tiene dolor de jace todos los días y no se fadi aun después de tratarlo.      •Alissa efra de jace cambian u ocurren nuevos síntomas cuando tiene dolor de jace.      •Usted tiene preguntas o inquietudes acerca de up condición o cuidado.      Medicamentos:Es posible que usted necesite alguno de los siguientes:   •Los analgésicos recetadospodrían administrarse. El medicamento que recomienda up médico dependerá del tipo de dolor de jace que tenga. Usted necesitará hien medicamentos para el dolor de jace según las indicaciones para evitar un problema llamado dolor de jace de rebote. Estos efra de jace ocurren con el uso regular de analgésicos para los trastornos de dolor de jace.      •Los SHAHRAM,magnolia el ibuprofeno, ayudan a disminuir la inflamación, el dolor y la fiebre. [place] medicamento está disponible con o sin hollie receta médica. Los SHAHRAM pueden causar sangrado estomacal o problemas renales en ciertas personas. Si usted sylvie un medicamento anticoagulante, siempre pregúntele a up médico si los SHAHRAM son seguros para usted. Siempre annel la etiqueta de iona medicamento y siga las instrucciones.      •Acetaminofénalivia el dolor y baja la fiebre. Está disponible sin receta médica. Pregunte la cantidad y la frecuencia con que debe tomarlos. Siga las indicaciones. Annel las etiquetas de todos los demás medicamentos que esté usando para saber si también contienen acetaminofén, o pregunte a up médico o farmacéutico. El acetaminofén puede causar daño en el hígado cuando no se sylvie de forma correcta. No use más de 3 gramos (3,000 miligramos) en total de acetaminofeno en un día.      •Antidepresivosse pueden administrar para algunos tipos de efra de jace.      •[place] alissa medicamentos magnolia se le haya indicado.Consulte con up médico si usted reyna que up medicamento no le está ayudando o si presenta efectos secundarios. Infórmele si es alérgico a cualquier medicamento. Mantenga hollie lista actualizada de los medicamentos, las vitaminas y los productos herbales que sylvie. Incluya los siguientes datos de los medicamentos: cantidad, frecuencia y motivo de administración. Traiga con usted la lista o los envases de las píldoras a alissa citas de seguimiento. Lleve la lista de los medicamentos con usted en racquel de hollie emergencia.      El manejo de alissa síntomas:  •Aplique hielo o caloren la corrie donde up hijo siente el dolor de jace. Utilice un paquete (compresa) de hielo o calor. Para un paquete de hielo, también puede colocar hielo molido en hollie bolsa plástica. Cubra el paquete de hielo o la bolsa con hollie toalla pequeña antes de aplicarla en la piel. Tanto el hielo magnolia el calor ayudan a reducir el dolor, y el calor también contribuye a reducir los espasmos musculares. Aplique calor geeta 20 a 30 minutos cada 2 horas. Aplique hielo geeta 15 a 20 minutos cada hora. Aplique calor o hielo geeta el tiempo y la cantidad de días que se le indique. Usted puede alternar el calor y el hielo.      •[place] alissa músculos.Acuéstese en hollie posición cómoda y cierre alissa ojos. Relaje alissa músculos lentamente. Comience por los dedos de los pies y avance hacia arriba al matt de up cuerpo.      •Registre en un diario alissa efra de jace.Escriba cuándo comienzan y terminan alissa migrañas. Incluya alissa síntomas y qué estaba haciendo cuando comenzó la migraña. Registre lo que comió y lo que tomó las 24 horas previas al comienzo de up migraña. Describa el dolor y dónde le duele: Lleve un registro de lo que hizo para tratar up migraña y si obtuvo un resultado satisfactorio.      Cómo prevenir un dolor de jace ashley:  •Evite cualquier cosa que provoque un dolor de jace ashley.Los ejemplos incluyen la exposición a sustancias químicas, las grandes altitudes o no dormir lo suficiente. Reyna hollie rutina para dormir. Acuéstese y levántese todos los días a la misma hora. No utilice aparatos electrónicos antes de acostarse. Pueden provocarle un dolor de jace o impedirle dormir tim.      •No fume.La nicotina y otras sustancias químicas en los cigarrillos y puros pueden desencadenar un dolor de jace ashley o empeorarlo. Pida información a up médico si usted actualmente fuma y necesita ayuda para dejar de fumar. Los cigarrillos electrónicos o el tabaco sin humo igualmente contienen nicotina. Consulte con up médico antes de utilizar estos productos.      •Limite el consumo de alcohol según le indicaron.El alcohol puede provocar un dolor de jace ashley o empeorarlo. Si usted tiene efra de jace de racimo, no artemio alcohol geeta un episodio. Para otros tipos de efra de jace, pregúntele a up proveedor de atención médica si es seguro para usted beber alcohol. Pregunte cuál es la cantidad escoto que puede beber y con qué frecuencia.      •Ejercítese según indicaciones.El ejercicio puede reducir la tensión y ayudarlo a aliviar el dolor de jace. Propóngase hacer 30 minutos de actividad física milagros todos los días de la semana. Up médico puede ayudarle a crear un plan de ejercicios.      •Consuma alimentos saludables y variados.Los alimentos saludables incluyen las frutas, verduras, productos lácteos bajos en grasa, keya magras, pescado y frijoles cocidos. Up médico o dietista puede ayudarle a crear planes de comidas si desea evitar los alimentos que provocan efra de jace.      Acuda a alissa consultas de control con up médico según le indicaron.Traiga up registro de efra de jace con usted cuando visite a up médico. Anote alissa preguntas para que se acuerde de hacerlas geeta alissa visitas.

## 2022-04-30 NOTE — ED CDU PROVIDER DISPOSITION NOTE - CLINICAL COURSE
38 yo female, PMH hyperlipidemia (no meds), and alopecia (on spironolactone), no stated PSH; pt presented to the ED c/o frontal headache earlier today ~1130 hrs and LUE/LLE weakness a/w LUE paresthesias ~1300 hrs.  No hx/o trauma or recent illness.  Pt is on oral contraceptives daily.    In the ED, pt was evaluated by Neurology as code stroke, and had CT head, CTA head/neck, and CT perfusion studies performed, all with no acute pathology noted.  pt was dispo'd to CDU for continued care plan:  Tele monitoring, neuro checks, MRI/MRV. MRIs negative. Likely complex migraine - stable for DC with outpt neuro follow up.

## 2022-05-03 ENCOUNTER — NON-APPOINTMENT (OUTPATIENT)
Age: 37
End: 2022-05-03

## 2022-05-11 NOTE — ED CDU PROVIDER SUBSEQUENT DAY NOTE - PROGRESS NOTE DETAILS
CDU - LUBNA Shukla Pt. currently resting comfortably, no complaints at this time. Will continue current plan and observation.

## 2022-05-11 NOTE — ED CDU PROVIDER SUBSEQUENT DAY NOTE - HISTORY
36 yo female, PMH hyperlipidemia (no meds), and alopecia (on spironolactone), no stated PSH; pt presented to the ED c/o frontal headache earlier today ~1130 hrs and LUE/LLE weakness a/w LUE paresthesias ~1300 hrs.  No hx/o trauma or recent illness.  Pt is on oral contraceptives daily.    	In the ED, pt was evaluated by Neurology as code stroke, and had CT head, CTA head/neck, and CT perfusion studies performed, all with no acute pathology noted.  Labs significant for WBC 12.85, CMP: anion gap 17, bicarb 19, CMP otherwise unremarkable, troponin <6.  MRI head/MRV head was advised; pt was dispo'd to CDU for continued care plan:  Tele monitoring, neuro checks, MRIs per orders, Neuro team following patient; supportive care, general observation care / monitoring.  On CDU evaluation, pt denies active headache or vision changes; no nausea or other discomfort.  Pt. states she still feels slight residual weakness in her LUE and LLE, generalized.  Pt denies dizziness, ataxia, LOC; no other c/o.  CDU care plan d/w pt who verbalizes agreement with plan.

## 2022-05-19 ENCOUNTER — APPOINTMENT (OUTPATIENT)
Dept: CARDIOLOGY | Facility: CLINIC | Age: 37
End: 2022-05-19

## 2022-09-16 RX ORDER — TRETINOIN 0.5 MG/G
0.05 CREAM TOPICAL
Qty: 1 | Refills: 3 | Status: ACTIVE | COMMUNITY
Start: 2022-09-16 | End: 1900-01-01

## 2023-03-27 RX ORDER — FLUOCINOLONE ACETONIDE, HYDROQUINONE, AND TRETINOIN .1; 40; .5 MG/G; MG/G; MG/G
0.01-4-0.05 CREAM TOPICAL
Qty: 15 | Refills: 0 | Status: ACTIVE | COMMUNITY
Start: 2023-03-27 | End: 1900-01-01

## 2023-07-24 RX ORDER — ACYCLOVIR 50 MG/G
5 OINTMENT TOPICAL
Qty: 1 | Refills: 3 | Status: ACTIVE | COMMUNITY
Start: 2023-07-24 | End: 1900-01-01

## 2023-07-24 RX ORDER — TRETINOIN 0.5 MG/G
0.05 CREAM TOPICAL
Qty: 1 | Refills: 3 | Status: ACTIVE | COMMUNITY
Start: 2023-07-24 | End: 1900-01-01